# Patient Record
Sex: FEMALE | Race: WHITE | NOT HISPANIC OR LATINO | Employment: UNEMPLOYED | URBAN - METROPOLITAN AREA
[De-identification: names, ages, dates, MRNs, and addresses within clinical notes are randomized per-mention and may not be internally consistent; named-entity substitution may affect disease eponyms.]

---

## 2023-01-31 PROBLEM — F51.04 CHRONIC INSOMNIA: Status: ACTIVE | Noted: 2023-01-31

## 2023-01-31 PROBLEM — S20.211A RIB CONTUSION, RIGHT, INITIAL ENCOUNTER: Status: ACTIVE | Noted: 2023-01-31

## 2023-01-31 PROBLEM — R73.9 ELEVATED BLOOD SUGAR: Status: ACTIVE | Noted: 2023-01-31

## 2023-01-31 PROBLEM — J06.9 UPPER RESPIRATORY INFECTION WITH COUGH AND CONGESTION: Status: ACTIVE | Noted: 2023-01-31

## 2023-01-31 PROBLEM — M54.16 LEFT LUMBAR RADICULOPATHY: Status: ACTIVE | Noted: 2023-01-31

## 2023-01-31 PROBLEM — B00.9 HERPES SIMPLEX: Status: ACTIVE | Noted: 2023-01-31

## 2023-01-31 PROBLEM — R76.8 POSITIVE SM/RNP ANTIBODY: Status: ACTIVE | Noted: 2023-01-31

## 2023-01-31 PROBLEM — F41.9 MILD ANXIETY: Status: ACTIVE | Noted: 2023-01-31

## 2023-01-31 PROBLEM — G25.81 RESTLESS LEGS SYNDROME: Status: ACTIVE | Noted: 2023-01-31

## 2023-01-31 PROBLEM — J45.909 ASTHMA (HHS-HCC): Status: ACTIVE | Noted: 2023-01-31

## 2023-01-31 PROBLEM — G89.29: Status: ACTIVE | Noted: 2023-01-31

## 2023-01-31 PROBLEM — M94.0 COSTOCHONDRITIS: Status: ACTIVE | Noted: 2023-01-31

## 2023-01-31 PROBLEM — I48.91 NEW ONSET ATRIAL FIBRILLATION (MULTI): Status: ACTIVE | Noted: 2023-01-31

## 2023-01-31 PROBLEM — H61.20 CERUMEN IMPACTION: Status: ACTIVE | Noted: 2023-01-31

## 2023-01-31 PROBLEM — G56.00 CARPAL TUNNEL SYNDROME: Status: ACTIVE | Noted: 2023-01-31

## 2023-01-31 PROBLEM — J06.9 URI (UPPER RESPIRATORY INFECTION): Status: ACTIVE | Noted: 2023-01-31

## 2023-01-31 PROBLEM — U07.1 COVID-19: Status: ACTIVE | Noted: 2023-01-31

## 2023-01-31 PROBLEM — I48.20 CHRONIC ATRIAL FIBRILLATION (MULTI): Status: ACTIVE | Noted: 2023-01-31

## 2023-01-31 PROBLEM — J32.9 RHINOSINUSITIS: Status: ACTIVE | Noted: 2023-01-31

## 2023-01-31 PROBLEM — L40.50 PSORIATIC ARTHRITIS (MULTI): Status: ACTIVE | Noted: 2023-01-31

## 2023-01-31 PROBLEM — S46.019A ROTATOR CUFF STRAIN: Status: ACTIVE | Noted: 2023-01-31

## 2023-01-31 PROBLEM — R73.01 IMPAIRED FASTING GLUCOSE: Status: ACTIVE | Noted: 2023-01-31

## 2023-01-31 PROBLEM — E04.9 NONTOXIC GOITER: Status: ACTIVE | Noted: 2023-01-31

## 2023-01-31 PROBLEM — R04.2 COUGH WITH HEMOPTYSIS: Status: ACTIVE | Noted: 2023-01-31

## 2023-01-31 PROBLEM — N18.9 CKD (CHRONIC KIDNEY DISEASE): Status: ACTIVE | Noted: 2023-01-31

## 2023-01-31 PROBLEM — R06.02 SOB (SHORTNESS OF BREATH) ON EXERTION: Status: ACTIVE | Noted: 2023-01-31

## 2023-01-31 PROBLEM — J01.90 ACUTE SINUSITIS: Status: ACTIVE | Noted: 2023-01-31

## 2023-01-31 PROBLEM — N28.9 KIDNEY FUNCTION ABNORMAL: Status: ACTIVE | Noted: 2023-01-31

## 2023-01-31 PROBLEM — J01.90 ACUTE RHINOSINUSITIS: Status: ACTIVE | Noted: 2023-01-31

## 2023-01-31 PROBLEM — E03.9 HYPOTHYROIDISM: Status: ACTIVE | Noted: 2023-01-31

## 2023-01-31 PROBLEM — J06.9 UPPER RESPIRATORY INFECTION, ACUTE: Status: ACTIVE | Noted: 2023-01-31
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
## 2023-01-31 PROBLEM — I48.91 ATRIAL FIBRILLATION WITH RVR (MULTI): Status: ACTIVE | Noted: 2023-01-31

## 2023-01-31 PROBLEM — S22.39XA RIB FRACTURE: Status: ACTIVE | Noted: 2023-01-31

## 2023-01-31 PROBLEM — R09.82 POSTNASAL DRIP: Status: ACTIVE | Noted: 2023-01-31

## 2023-01-31 PROBLEM — R51.9 SINUS HEADACHE: Status: ACTIVE | Noted: 2023-01-31

## 2023-01-31 PROBLEM — I10 BENIGN ESSENTIAL HYPERTENSION: Status: ACTIVE | Noted: 2023-01-31

## 2023-01-31 PROBLEM — B00.1 COLD SORE: Status: ACTIVE | Noted: 2023-01-31

## 2023-01-31 PROBLEM — M54.50: Status: ACTIVE | Noted: 2023-01-31

## 2023-01-31 PROBLEM — E78.5 HYPERLIPIDEMIA: Status: ACTIVE | Noted: 2023-01-31

## 2023-01-31 PROBLEM — R60.0 LOWER EXTREMITY EDEMA: Status: ACTIVE | Noted: 2023-01-31

## 2023-01-31 PROBLEM — R05.9 COUGH: Status: ACTIVE | Noted: 2023-01-31

## 2023-01-31 RX ORDER — CLOBETASOL PROPIONATE 0.5 MG/G
1 CREAM TOPICAL 2 TIMES DAILY
COMMUNITY

## 2023-01-31 RX ORDER — TRAZODONE HYDROCHLORIDE 50 MG/1
50 TABLET ORAL NIGHTLY PRN
COMMUNITY
Start: 2016-03-24

## 2023-01-31 RX ORDER — ACETAMINOPHEN 500 MG
1 TABLET ORAL EVERY 4 HOURS PRN
COMMUNITY
Start: 2017-12-04

## 2023-01-31 RX ORDER — PREDNISONE 5 MG/1
1 TABLET ORAL DAILY
COMMUNITY
Start: 2021-06-22

## 2023-01-31 RX ORDER — LEVOTHYROXINE SODIUM 88 UG/1
1 TABLET ORAL DAILY
COMMUNITY
Start: 2015-09-29 | End: 2024-03-25 | Stop reason: SDUPTHER

## 2023-01-31 RX ORDER — ATENOLOL 25 MG/1
50 TABLET ORAL DAILY
COMMUNITY
Start: 2017-12-04 | End: 2023-03-20 | Stop reason: SDUPTHER

## 2023-01-31 RX ORDER — CETIRIZINE HYDROCHLORIDE 10 MG/1
1 TABLET ORAL DAILY
COMMUNITY
Start: 2017-12-04

## 2023-01-31 RX ORDER — FENOFIBRATE 48 MG/1
1 TABLET, FILM COATED ORAL DAILY
COMMUNITY
Start: 2021-04-07 | End: 2023-03-20 | Stop reason: SDUPTHER

## 2023-01-31 RX ORDER — ROPINIROLE 0.5 MG/1
1 TABLET, FILM COATED ORAL NIGHTLY
COMMUNITY
Start: 2020-01-08 | End: 2024-02-01 | Stop reason: SDUPTHER

## 2023-01-31 RX ORDER — ROSUVASTATIN CALCIUM 40 MG/1
1 TABLET, COATED ORAL DAILY
COMMUNITY
Start: 2016-10-06 | End: 2024-02-01 | Stop reason: SDUPTHER

## 2023-01-31 RX ORDER — VALACYCLOVIR HYDROCHLORIDE 1 G/1
1000 TABLET, FILM COATED ORAL 2 TIMES DAILY
COMMUNITY

## 2023-01-31 RX ORDER — DILTIAZEM HYDROCHLORIDE 120 MG/1
1 CAPSULE, COATED, EXTENDED RELEASE ORAL DAILY
COMMUNITY
Start: 2017-09-08 | End: 2023-03-20 | Stop reason: SDUPTHER

## 2023-01-31 RX ORDER — OMEPRAZOLE 40 MG/1
1 CAPSULE, DELAYED RELEASE ORAL DAILY
COMMUNITY
Start: 2019-08-21 | End: 2023-09-18 | Stop reason: SDUPTHER

## 2023-01-31 RX ORDER — CELECOXIB 200 MG/1
1-2 CAPSULE ORAL AS NEEDED
COMMUNITY
Start: 2020-05-05

## 2023-01-31 RX ORDER — MOMETASONE FUROATE 100 UG/1
2 AEROSOL RESPIRATORY (INHALATION) DAILY
COMMUNITY
Start: 2019-08-21 | End: 2023-10-26 | Stop reason: SDUPTHER

## 2023-03-14 ENCOUNTER — OFFICE VISIT (OUTPATIENT)
Dept: PRIMARY CARE | Facility: CLINIC | Age: 63
End: 2023-03-14
Payer: COMMERCIAL

## 2023-03-14 VITALS
HEIGHT: 68 IN | BODY MASS INDEX: 27.43 KG/M2 | SYSTOLIC BLOOD PRESSURE: 128 MMHG | WEIGHT: 181 LBS | DIASTOLIC BLOOD PRESSURE: 88 MMHG

## 2023-03-14 DIAGNOSIS — Z00.00 ANNUAL PHYSICAL EXAM: Primary | ICD-10-CM

## 2023-03-14 DIAGNOSIS — N18.30 STAGE 3 CHRONIC KIDNEY DISEASE, UNSPECIFIED WHETHER STAGE 3A OR 3B CKD (MULTI): ICD-10-CM

## 2023-03-14 DIAGNOSIS — I48.20 CHRONIC ATRIAL FIBRILLATION (MULTI): ICD-10-CM

## 2023-03-14 DIAGNOSIS — I10 BENIGN ESSENTIAL HYPERTENSION: ICD-10-CM

## 2023-03-14 DIAGNOSIS — E03.9 HYPOTHYROIDISM, UNSPECIFIED TYPE: ICD-10-CM

## 2023-03-14 DIAGNOSIS — E78.5 HYPERLIPIDEMIA, UNSPECIFIED HYPERLIPIDEMIA TYPE: ICD-10-CM

## 2023-03-14 DIAGNOSIS — R73.01 IMPAIRED FASTING GLUCOSE: ICD-10-CM

## 2023-03-14 DIAGNOSIS — I48.91 ATRIAL FIBRILLATION WITH RVR (MULTI): ICD-10-CM

## 2023-03-14 DIAGNOSIS — L40.50 PSORIATIC ARTHRITIS (MULTI): ICD-10-CM

## 2023-03-14 PROBLEM — J01.90 ACUTE SINUSITIS: Status: RESOLVED | Noted: 2023-01-31 | Resolved: 2023-03-14

## 2023-03-14 PROBLEM — F51.04 CHRONIC INSOMNIA: Status: RESOLVED | Noted: 2023-01-31 | Resolved: 2023-03-14

## 2023-03-14 PROBLEM — E04.9 NONTOXIC GOITER: Status: RESOLVED | Noted: 2023-01-31 | Resolved: 2023-03-14

## 2023-03-14 PROBLEM — R04.2 COUGH WITH HEMOPTYSIS: Status: RESOLVED | Noted: 2023-01-31 | Resolved: 2023-03-14

## 2023-03-14 PROBLEM — M54.50: Status: RESOLVED | Noted: 2023-01-31 | Resolved: 2023-03-14

## 2023-03-14 PROBLEM — U07.1 COVID-19: Status: RESOLVED | Noted: 2023-01-31 | Resolved: 2023-03-14

## 2023-03-14 PROBLEM — S22.39XA RIB FRACTURE: Status: RESOLVED | Noted: 2023-01-31 | Resolved: 2023-03-14

## 2023-03-14 PROBLEM — R51.9 SINUS HEADACHE: Status: RESOLVED | Noted: 2023-01-31 | Resolved: 2023-03-14

## 2023-03-14 PROBLEM — R60.0 LOWER EXTREMITY EDEMA: Status: RESOLVED | Noted: 2023-01-31 | Resolved: 2023-03-14

## 2023-03-14 PROBLEM — S46.019A ROTATOR CUFF STRAIN: Status: RESOLVED | Noted: 2023-01-31 | Resolved: 2023-03-14

## 2023-03-14 PROBLEM — G56.00 CARPAL TUNNEL SYNDROME: Status: RESOLVED | Noted: 2023-01-31 | Resolved: 2023-03-14
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
## 2023-03-14 PROBLEM — R05.9 COUGH: Status: RESOLVED | Noted: 2023-01-31 | Resolved: 2023-03-14

## 2023-03-14 PROBLEM — R73.9 ELEVATED BLOOD SUGAR: Status: RESOLVED | Noted: 2023-01-31 | Resolved: 2023-03-14

## 2023-03-14 PROBLEM — J06.9 URI (UPPER RESPIRATORY INFECTION): Status: RESOLVED | Noted: 2023-01-31 | Resolved: 2023-03-14

## 2023-03-14 PROBLEM — R06.02 SOB (SHORTNESS OF BREATH) ON EXERTION: Status: RESOLVED | Noted: 2023-01-31 | Resolved: 2023-03-14

## 2023-03-14 PROBLEM — J06.9 UPPER RESPIRATORY INFECTION, ACUTE: Status: RESOLVED | Noted: 2023-01-31 | Resolved: 2023-03-14

## 2023-03-14 PROBLEM — H61.20 CERUMEN IMPACTION: Status: RESOLVED | Noted: 2023-01-31 | Resolved: 2023-03-14

## 2023-03-14 PROBLEM — G25.81 RESTLESS LEGS SYNDROME: Status: RESOLVED | Noted: 2023-01-31 | Resolved: 2023-03-14

## 2023-03-14 PROBLEM — N28.9 KIDNEY FUNCTION ABNORMAL: Status: RESOLVED | Noted: 2023-01-31 | Resolved: 2023-03-14

## 2023-03-14 PROBLEM — M94.0 COSTOCHONDRITIS: Status: RESOLVED | Noted: 2023-01-31 | Resolved: 2023-03-14

## 2023-03-14 PROBLEM — S20.211A RIB CONTUSION, RIGHT, INITIAL ENCOUNTER: Status: RESOLVED | Noted: 2023-01-31 | Resolved: 2023-03-14

## 2023-03-14 PROBLEM — G89.29: Status: RESOLVED | Noted: 2023-01-31 | Resolved: 2023-03-14

## 2023-03-14 PROBLEM — J45.909 ASTHMA (HHS-HCC): Status: RESOLVED | Noted: 2023-01-31 | Resolved: 2023-03-14

## 2023-03-14 PROBLEM — R09.82 POSTNASAL DRIP: Status: RESOLVED | Noted: 2023-01-31 | Resolved: 2023-03-14

## 2023-03-14 PROBLEM — J06.9 UPPER RESPIRATORY INFECTION WITH COUGH AND CONGESTION: Status: RESOLVED | Noted: 2023-01-31 | Resolved: 2023-03-14

## 2023-03-14 PROBLEM — B00.1 COLD SORE: Status: RESOLVED | Noted: 2023-01-31 | Resolved: 2023-03-14

## 2023-03-14 PROBLEM — J01.90 ACUTE RHINOSINUSITIS: Status: RESOLVED | Noted: 2023-01-31 | Resolved: 2023-03-14

## 2023-03-14 PROBLEM — M54.16 LEFT LUMBAR RADICULOPATHY: Status: RESOLVED | Noted: 2023-01-31 | Resolved: 2023-03-14

## 2023-03-14 PROCEDURE — 3079F DIAST BP 80-89 MM HG: CPT | Performed by: INTERNAL MEDICINE

## 2023-03-14 PROCEDURE — 99396 PREV VISIT EST AGE 40-64: CPT | Performed by: INTERNAL MEDICINE

## 2023-03-14 PROCEDURE — 3074F SYST BP LT 130 MM HG: CPT | Performed by: INTERNAL MEDICINE

## 2023-03-14 NOTE — PROGRESS NOTES
"Subjective   Patient ID: Sommer Guido is a 62 y.o. female who presents for the following    PHYSICAL     Assessment/Plan   impaired fasting glucose:  STABLE     psoriatic arthritis possibly impacting her lungs (uses an inhaler): stable, following with dr vargas , getting immunologic infusion at this time     asthma: patient follows with dr mackenzie.      a-fib : stable on xeralto, follows with dr RILEY     hypothyroid: check TSH       Follow up with GYN and follow up for mammogram     Colonoscopy is due, recommend follow up    HPI  with HTN, HLD, hypothyroid, DIANNA (CPAP), CKD3, and Chronic A. Fib (xarelto and atenolol); hx COVID here for the following            psoriatic arthritis: following with rheumatology. patient mentions that she was placed on prednisone for her \"lungs\" as she was told that her psoriatic arthritis is impacting her lungs. she gets aria gtt but she is no longer on prednisone      A. fib: Stable, on Xarelto and atenolol, follows with Dr. RILEY     DIANNA: stable, on CPAP, follows with dr mackenzie.      CKD3: Patient has been taking off diuretics, advised to avoid NSAIDS. patient has small amounts of protein in the urine. renal ultrasound may 28, 2020 is wnl.      hypothyroid: patient denies any hypo or hypothyroid symptoms. patient denies any palpitations, diarrhea or constipation     HTN: stable,      HLD: Patient denies any muscle aches and is tolerating statin therapy    Past surgical hx:  tonsil stones/bilateral tonsillectomy Dr Skinner 9/14/2022    Visit Vitals  /88   Ht 1.727 m (5' 8\")   Wt 82.1 kg (181 lb)   BMI 27.52 kg/m²   Smoking Status Former   BSA 1.98 m²     PHYSICAL EXAM   General appearance: Alert and in no acute distress. speech is clear and coherent  HEENT: Sclera and conjunctiva white, EOMI, uvela midline, no mouth lesions. PERRLA,  nasal turbinates are not swollen without exudate. TM's Tucker with cone of light, external ear canal with scant cerumen. No head trauma  Neck: no carotid " bruits or thyromegaly. no lymphadenopathy   Respiratory : No respiratory distress, normal respiratory rhythm and effort. Clear bilateral breath sounds. No wheezing or rhonchi.   Cardiovascular: heart rate regular, S1, S2. no murmurs. no Lower extremity edema  Skin inspection: Normal skin color and pigmentation, normal skin turgor and no visible rash, induration, or cellulitis  MSK: 5/5 strength upper and lower extremities without gait abnormalities. no loss of muscle mass   Neuro: 2-12 CN grossly intact.  no slurred speech. no lateralizing deficit  Psychiatric Orientation: Oriented to person, place, and time. no depression, homicidal or suicidal thoughts, normal affect  Abdomen: soft, none tender, none distended. no organomegaly      REVIEW OF SYSTEMS     Constitutional: not feeling tired and no fever, chills or sweats. Denies weight loss    HEENT: no earache and no sore throat. no blurred vision and or double vision. no headache  Cardiovascular: no exertional chest pain, no palpitations, no lower extremity edema and no intermittent leg claudication.   Lungs: Denies shortness of breath, exertional dyspnea, wheezing  Gastrointestinal: no change in bowel habits, no diarrhea, no nausea, no vomiting and no abdominal pain. Denies Melena, brbpr or dark stool  Musculoskeletal: no myalgias, no muscle weakness and no limb swelling.   Skin: no rashes, no change in skin color and pigmentation and no skin lumps.   Neurological: no headaches, no seizures, no numbness, no lateralizing deficits and no fainting.   Psychiatric: no depression and no anxiety.   Urine: denies polyuria, hematuria, dysuria   Endocrine: no cold intolerance, no heat intolerance    Allergies   Allergen Reactions    Losartan Unknown       Current Outpatient Medications   Medication Sig Dispense Refill    acetaminophen (Tylenol Extra Strength) 500 mg tablet Take 1 tablet (500 mg) by mouth every 4 (four) hours if needed. 4-6 hours      atenolol (Tenormin) 25  mg tablet Take 2 tablets (50 mg) by mouth in the morning. 1/2 tab prn      celecoxib (CeleBREX) 200 mg capsule Take 1-2 capsules (200-400 mg) by mouth if needed.      cetirizine (ZyrTEC) 10 mg tablet Take 1 tablet (10 mg) by mouth 1 (one) time each day.      clobetasol (Temovate) 0.05 % cream Apply 1 Application topically in the morning and at bedtime. Apply to affected area      dilTIAZem CD (Cartia XT) 120 mg 24 hr capsule Take 1 capsule (120 mg) by mouth 1 (one) time each day.      fenofibrate (Tricor) 48 mg tablet Take 1 tablet (48 mg) by mouth 1 (one) time each day.      levothyroxine (Synthroid, Levoxyl) 88 mcg tablet Take 1 tablet (88 mcg) by mouth 1 (one) time each day.      mometasone (Asmanex HFA) 100 mcg/actuation HFA aerosol inhaler Inhale 2 puffs 1 (one) time each day.      omeprazole (PriLOSEC) 40 mg DR capsule Take 1 capsule (40 mg) by mouth 1 (one) time each day.      predniSONE (Deltasone) 5 mg tablet Take 1 tablet (5 mg) by mouth 1 (one) time each day.      rivaroxaban (Xarelto) 20 mg tablet Take 1 tablet (20 mg) by mouth 1 (one) time each day. Take with food      rOPINIRole (Requip) 0.5 mg tablet Take 1 tablet (0.5 mg) by mouth at bedtime.      rosuvastatin (Crestor) 40 mg tablet Take 1 tablet (40 mg) by mouth 1 (one) time each day.      traZODone (Desyrel) 50 mg tablet Take 1 tablet (50 mg) by mouth if needed at bedtime.      valACYclovir (Valtrex) 1 gram tablet Take 1 tablet (1,000 mg) by mouth in the morning and at bedtime. For 10 days then reduce to 1 tab daily thereafter       No current facility-administered medications for this visit.       Objective     No visits with results within 4 Month(s) from this visit.   Latest known visit with results is:   Legacy Encounter on 11/30/2021   Component Date Value Ref Range Status    Glucose 11/30/2021 70 (L)  74 - 99 mg/dL Final    Sodium 11/30/2021 144  136 - 145 mmol/L Final    Potassium 11/30/2021 4.1  3.5 - 5.3 mmol/L Final    Chloride 11/30/2021  106  98 - 107 mmol/L Final    Bicarbonate 11/30/2021 27  21 - 32 mmol/L Final    Anion Gap 11/30/2021 15  10 - 20 mmol/L Final    Urea Nitrogen 11/30/2021 32 (H)  6 - 23 mg/dL Final    Creatinine 11/30/2021 1.04  0.50 - 1.05 mg/dL Final    GLOMERULAR FILTRATION RATE-NON AFR* 11/30/2021 54 (A)  >60 mL/min/1.73m2 Final    GLOMERULAR FILTRATION RATE-* 11/30/2021 65  >60 mL/min/1.73m2 Final    Calcium 11/30/2021 9.8  8.6 - 10.6 mg/dL Final    Albumin 11/30/2021 4.2  3.4 - 5.0 g/dL Final    Alkaline Phosphatase 11/30/2021 65  33 - 136 U/L Final    Total Protein 11/30/2021 6.9  6.4 - 8.2 g/dL Final    AST 11/30/2021 17  9 - 39 U/L Final    Total Bilirubin 11/30/2021 0.5  0.0 - 1.2 mg/dL Final    ALT (SGPT) 11/30/2021 28  7 - 45 U/L Final    WBC 11/30/2021 11.8 (H)  4.4 - 11.3 x10E9/L Final    nRBC 11/30/2021 0.0  0.0 - 0.0 /100 WBC Final    RBC 11/30/2021 4.07  4.00 - 5.20 x10E12/L Final    Hemoglobin 11/30/2021 13.1  12.0 - 16.0 g/dL Final    Hematocrit 11/30/2021 41.9  36.0 - 46.0 % Final    MCV 11/30/2021 103 (H)  80 - 100 fL Final    MCHC 11/30/2021 31.3 (L)  32.0 - 36.0 g/dL Final    Platelets 11/30/2021 262  150 - 450 x10E9/L Final    RDW 11/30/2021 13.5  11.5 - 14.5 % Final    Sedimentation Rate 11/30/2021 10  0 - 30 mm/h Final    CRP 11/30/2021 <0.10  mg/dL Final    Hemoglobin A1C 11/30/2021 6.4 (A)  % Final    Estimated Average Glucose 11/30/2021 137  MG/DL Final    TSH 11/30/2021 0.87  0.44 - 3.98 mIU/L Final       Radiology: Reviewed imaging in powerchart.  No results found.    Family History   Problem Relation Name Age of Onset    Atrial fibrillation Father      Atrial fibrillation Brother      Atrial fibrillation Mother's Sister      Diabetes Mother's Sister      Alzheimer's disease Maternal Grandmother       Social History     Socioeconomic History    Marital status:      Spouse name: None    Number of children: None    Years of education: None    Highest education level: None   Occupational  History    None   Tobacco Use    Smoking status: Former     Types: Cigarettes    Smokeless tobacco: None   Vaping Use    Vaping status: None   Substance and Sexual Activity    Alcohol use: Not Currently    Drug use: None    Sexual activity: None   Other Topics Concern    None   Social History Narrative    None     Social Determinants of Health     Financial Resource Strain: Not on file   Food Insecurity: Not on file   Transportation Needs: Not on file   Physical Activity: Not on file   Stress: Not on file   Social Connections: Not on file   Intimate Partner Violence: Not on file   Housing Stability: Not on file     Past Medical History:   Diagnosis Date    Contusion of right front wall of thorax, initial encounter     Bruised rib, right, initial encounter    Cutaneous abscess of face 04/14/2015    Abscess, eyebrow    Essential (primary) hypertension 06/15/2021    Benign essential hypertension    Hyperlipidemia, unspecified 09/13/2022    Hyperlipidemia    Hypothyroidism, unspecified 09/13/2022    Hypothyroidism    Toxic effect of venom of bees, accidental (unintentional), initial encounter 08/29/2019    Bee sting    Unspecified atrial fibrillation (CMS/HCC) 08/21/2019    Atrial fibrillation with RVR     Past Surgical History:   Procedure Laterality Date    OTHER SURGICAL HISTORY  11/26/2018    Ablation    OTHER SURGICAL HISTORY  11/26/2018    Tubal ligation       Charting was completed using voice recognition technology and may include unintended errors.

## 2023-03-20 DIAGNOSIS — E78.5 HYPERLIPIDEMIA, UNSPECIFIED HYPERLIPIDEMIA TYPE: ICD-10-CM

## 2023-03-20 DIAGNOSIS — I48.91 ATRIAL FIBRILLATION WITH RVR (MULTI): ICD-10-CM

## 2023-03-20 DIAGNOSIS — I10 BENIGN ESSENTIAL HYPERTENSION: ICD-10-CM

## 2023-03-21 RX ORDER — DILTIAZEM HYDROCHLORIDE 120 MG/1
120 CAPSULE, COATED, EXTENDED RELEASE ORAL DAILY
Qty: 90 CAPSULE | Refills: 1 | Status: SHIPPED | OUTPATIENT
Start: 2023-03-21 | End: 2023-12-04 | Stop reason: SDUPTHER

## 2023-03-21 RX ORDER — ATENOLOL 25 MG/1
50 TABLET ORAL DAILY
Qty: 180 TABLET | Refills: 1 | Status: SHIPPED | OUTPATIENT
Start: 2023-03-21 | End: 2024-03-25 | Stop reason: SDUPTHER

## 2023-03-21 RX ORDER — FENOFIBRATE 48 MG/1
48 TABLET, FILM COATED ORAL DAILY
Qty: 90 TABLET | Refills: 1 | Status: SHIPPED | OUTPATIENT
Start: 2023-03-21 | End: 2023-12-04 | Stop reason: SDUPTHER

## 2023-04-12 ENCOUNTER — TELEPHONE (OUTPATIENT)
Dept: PRIMARY CARE | Facility: CLINIC | Age: 63
End: 2023-04-12
Payer: COMMERCIAL

## 2023-04-12 NOTE — TELEPHONE ENCOUNTER
Patient has valtrex for cold sores and now has fever blisters needs something stronger. Can you rx something?

## 2023-09-11 ENCOUNTER — OFFICE VISIT (OUTPATIENT)
Dept: PRIMARY CARE | Facility: CLINIC | Age: 63
End: 2023-09-11
Payer: COMMERCIAL

## 2023-09-11 ENCOUNTER — LAB (OUTPATIENT)
Dept: LAB | Facility: LAB | Age: 63
End: 2023-09-11
Payer: COMMERCIAL

## 2023-09-11 VITALS
HEIGHT: 68 IN | WEIGHT: 182.4 LBS | SYSTOLIC BLOOD PRESSURE: 108 MMHG | DIASTOLIC BLOOD PRESSURE: 60 MMHG | HEART RATE: 88 BPM | TEMPERATURE: 98.4 F | BODY MASS INDEX: 27.65 KG/M2

## 2023-09-11 DIAGNOSIS — E78.5 HYPERLIPIDEMIA, UNSPECIFIED HYPERLIPIDEMIA TYPE: ICD-10-CM

## 2023-09-11 DIAGNOSIS — E03.9 HYPOTHYROIDISM, UNSPECIFIED TYPE: ICD-10-CM

## 2023-09-11 DIAGNOSIS — I48.20 CHRONIC ATRIAL FIBRILLATION (MULTI): ICD-10-CM

## 2023-09-11 DIAGNOSIS — Z13.9 SCREENING DUE: ICD-10-CM

## 2023-09-11 DIAGNOSIS — N18.31 STAGE 3A CHRONIC KIDNEY DISEASE (MULTI): ICD-10-CM

## 2023-09-11 DIAGNOSIS — I10 BENIGN ESSENTIAL HYPERTENSION: ICD-10-CM

## 2023-09-11 DIAGNOSIS — I10 BENIGN ESSENTIAL HYPERTENSION: Primary | ICD-10-CM

## 2023-09-11 DIAGNOSIS — R73.01 IMPAIRED FASTING GLUCOSE: ICD-10-CM

## 2023-09-11 LAB
ERYTHROCYTE DISTRIBUTION WIDTH (RATIO) BY AUTOMATED COUNT: 13.3 % (ref 11.5–14.5)
ERYTHROCYTE MEAN CORPUSCULAR HEMOGLOBIN CONCENTRATION (G/DL) BY AUTOMATED: 30.9 G/DL (ref 32–36)
ERYTHROCYTE MEAN CORPUSCULAR VOLUME (FL) BY AUTOMATED COUNT: 102 FL (ref 80–100)
ERYTHROCYTES (10*6/UL) IN BLOOD BY AUTOMATED COUNT: 4.11 X10E12/L (ref 4–5.2)
HEMATOCRIT (%) IN BLOOD BY AUTOMATED COUNT: 42.1 % (ref 36–46)
HEMOGLOBIN (G/DL) IN BLOOD: 13 G/DL (ref 12–16)
LEUKOCYTES (10*3/UL) IN BLOOD BY AUTOMATED COUNT: 11.3 X10E9/L (ref 4.4–11.3)
NRBC (PER 100 WBCS) BY AUTOMATED COUNT: 0 /100 WBC (ref 0–0)
PLATELETS (10*3/UL) IN BLOOD AUTOMATED COUNT: 260 X10E9/L (ref 150–450)

## 2023-09-11 PROCEDURE — 3074F SYST BP LT 130 MM HG: CPT | Performed by: INTERNAL MEDICINE

## 2023-09-11 PROCEDURE — 3078F DIAST BP <80 MM HG: CPT | Performed by: INTERNAL MEDICINE

## 2023-09-11 PROCEDURE — 82570 ASSAY OF URINE CREATININE: CPT

## 2023-09-11 PROCEDURE — 83036 HEMOGLOBIN GLYCOSYLATED A1C: CPT

## 2023-09-11 PROCEDURE — 84443 ASSAY THYROID STIM HORMONE: CPT

## 2023-09-11 PROCEDURE — 80053 COMPREHEN METABOLIC PANEL: CPT

## 2023-09-11 PROCEDURE — 84156 ASSAY OF PROTEIN URINE: CPT

## 2023-09-11 PROCEDURE — 85027 COMPLETE CBC AUTOMATED: CPT

## 2023-09-11 PROCEDURE — 80061 LIPID PANEL: CPT

## 2023-09-11 PROCEDURE — 82306 VITAMIN D 25 HYDROXY: CPT

## 2023-09-11 PROCEDURE — 99214 OFFICE O/P EST MOD 30 MIN: CPT | Performed by: INTERNAL MEDICINE

## 2023-09-11 PROCEDURE — 36415 COLL VENOUS BLD VENIPUNCTURE: CPT

## 2023-09-11 RX ORDER — DAPAGLIFLOZIN 10 MG/1
10 TABLET, FILM COATED ORAL DAILY
Qty: 90 TABLET | Refills: 3 | Status: SHIPPED | OUTPATIENT
Start: 2023-09-11 | End: 2023-09-22 | Stop reason: ALTCHOICE

## 2023-09-11 NOTE — PROGRESS NOTES
"Subjective   Patient ID: Sommer Guido is a 63 y.o. female who presents for the following    PHYSICAL 3/14/2023    Assessment/Plan   impaired fasting glucose:  Stable, check A1c      psoriatic arthritis possibly impacting her lungs (uses an inhaler): stable, following with dr vargas , getting immunologic infusion at this time     asthma: patient follows with dr mackenzie.      a-fib : stable on xeralto, follows with dr RILEY    CKD stage 3: GFR 50s     Started farxiga 10mg 9/11/2023     hypothyroid: check TSH       Follow up with GYN and follow up for mammogram     Colonoscopy is due, recommend follow up    HPI  with HTN, HLD, hypothyroid, DIANNA (CPAP), CKD3, and Chronic A. Fib (xarelto and atenolol); hx COVID here for the following       psoriatic arthritis: following with rheumatology. patient mentions that she was placed on prednisone for her \"lungs\" as she was told that her psoriatic arthritis is impacting her lungs. she gets aria gtt but she is no longer on prednisone      A. fib: Stable, on Xarelto and atenolol, follows with Dr. RILEY     DIANNA: stable, on CPAP, follows with dr mackenzie.      CKD3: Patient has been taking off diuretics, advised to avoid NSAIDS. patient has small amounts of protein in the urine. renal ultrasound may 28, 2020 is wnl.      hypothyroid: patient denies any hypo or hypothyroid symptoms. patient denies any palpitations, diarrhea or constipation     HTN: stable,      HLD: Patient denies any muscle aches and is tolerating statin therapy    Past surgical hx:  tonsil stones/bilateral tonsillectomy Dr Skinner 9/14/2022    Visit Vitals  /60 (BP Location: Right arm, Patient Position: Sitting)   Pulse 88   Temp 36.9 °C (98.4 °F)   Ht 1.727 m (5' 8\")   Wt 82.7 kg (182 lb 6.4 oz)   BMI 27.73 kg/m²   Smoking Status Former   BSA 1.99 m²     PHYSICAL EXAM   General appearance: Alert and in no acute distress. speech is clear and coherent  HEENT: Sclera and conjunctiva white, EOMI,     Respiratory : No " respiratory distress, normal respiratory rhythm and effort. Clear bilateral breath sounds. No wheezing or rhonchi.   Cardiovascular: heart rate regular, S1, S2. no murmurs. no Lower extremity edema  Skin inspection: Normal skin color and pigmentation, normal skin turgor and no visible rash, induration, or cellulitis  MSK: 5/5 strength upper and lower extremities without gait abnormalities. no loss of muscle mass   Neuro: 2-12 CN grossly intact.  no slurred speech. no lateralizing deficit  Psychiatric Orientation: Oriented to person, place, and time. no depression, homicidal or suicidal thoughts, normal affect       REVIEW OF SYSTEMS     Constitutional: not feeling tired and no fever, chills or sweats. Denies weight loss    HEENT: no earache and no sore throat. no blurred vision and or double vision. no headache  Cardiovascular: no exertional chest pain, no palpitations, no lower extremity edema    Lungs: Denies shortness of breath, exertional dyspnea, wheezing  Gastrointestinal: no change in bowel habits, no diarrhea, no nausea   Musculoskeletal: no myalgias, no muscle weakness and no limb swelling.    Neurological: no headaches, no seizures, no numbness, no lateralizing deficits   Psychiatric: no depression and no anxiety.   Urine: denies polyuria, hematuria, dysuria     Allergies   Allergen Reactions    Losartan Unknown       Current Outpatient Medications   Medication Sig Dispense Refill    acetaminophen (Tylenol Extra Strength) 500 mg tablet Take 1 tablet (500 mg) by mouth every 4 hours if needed. 4-6 hours      atenolol (Tenormin) 25 mg tablet Take 2 tablets (50 mg) by mouth once daily. 1/2 tab prn 180 tablet 1    celecoxib (CeleBREX) 200 mg capsule Take 1-2 capsules (200-400 mg) by mouth if needed.      cetirizine (ZyrTEC) 10 mg tablet Take 1 tablet (10 mg) by mouth once daily.      clobetasol (Temovate) 0.05 % cream Apply 1 Application topically 2 times a day. Apply to affected area      dilTIAZem CD (Cartia  XT) 120 mg 24 hr capsule Take 1 capsule (120 mg) by mouth once daily. 90 capsule 1    fenofibrate (Tricor) 48 mg tablet Take 1 tablet (48 mg) by mouth once daily. 90 tablet 1    levothyroxine (Synthroid, Levoxyl) 88 mcg tablet Take 1 tablet (88 mcg) by mouth once daily.      mometasone (Asmanex HFA) 100 mcg/actuation HFA aerosol inhaler Inhale 2 puffs 1 (one) time each day.      omeprazole (PriLOSEC) 40 mg DR capsule Take 1 capsule (40 mg) by mouth once daily.      predniSONE (Deltasone) 5 mg tablet Take 1 tablet (5 mg) by mouth once daily.      rivaroxaban (Xarelto) 20 mg tablet Take 1 tablet (20 mg) by mouth once daily in the evening. Take with meals. Take with food 90 tablet 1    rOPINIRole (Requip) 0.5 mg tablet Take 1 tablet (0.5 mg) by mouth once daily at bedtime.      rosuvastatin (Crestor) 40 mg tablet Take 1 tablet (40 mg) by mouth once daily.      traZODone (Desyrel) 50 mg tablet Take 1 tablet (50 mg) by mouth as needed at bedtime.      valACYclovir (Valtrex) 1 gram tablet Take 1 tablet (1,000 mg) by mouth 2 times a day. For 10 days then reduce to 1 tab daily thereafter       No current facility-administered medications for this visit.       Objective     No visits with results within 4 Month(s) from this visit.   Latest known visit with results is:   Legacy Encounter on 11/30/2021   Component Date Value Ref Range Status    Glucose 11/30/2021 70 (L)  74 - 99 mg/dL Final    Sodium 11/30/2021 144  136 - 145 mmol/L Final    Potassium 11/30/2021 4.1  3.5 - 5.3 mmol/L Final    Chloride 11/30/2021 106  98 - 107 mmol/L Final    Bicarbonate 11/30/2021 27  21 - 32 mmol/L Final    Anion Gap 11/30/2021 15  10 - 20 mmol/L Final    Urea Nitrogen 11/30/2021 32 (H)  6 - 23 mg/dL Final    Creatinine 11/30/2021 1.04  0.50 - 1.05 mg/dL Final    GLOMERULAR FILTRATION RATE-NON AFR* 11/30/2021 54 (A)  >60 mL/min/1.73m2 Final    GLOMERULAR FILTRATION RATE-* 11/30/2021 65  >60 mL/min/1.73m2 Final    Calcium 11/30/2021 9.8   8.6 - 10.6 mg/dL Final    Albumin 11/30/2021 4.2  3.4 - 5.0 g/dL Final    Alkaline Phosphatase 11/30/2021 65  33 - 136 U/L Final    Total Protein 11/30/2021 6.9  6.4 - 8.2 g/dL Final    AST 11/30/2021 17  9 - 39 U/L Final    Total Bilirubin 11/30/2021 0.5  0.0 - 1.2 mg/dL Final    ALT (SGPT) 11/30/2021 28  7 - 45 U/L Final    WBC 11/30/2021 11.8 (H)  4.4 - 11.3 x10E9/L Final    nRBC 11/30/2021 0.0  0.0 - 0.0 /100 WBC Final    RBC 11/30/2021 4.07  4.00 - 5.20 x10E12/L Final    Hemoglobin 11/30/2021 13.1  12.0 - 16.0 g/dL Final    Hematocrit 11/30/2021 41.9  36.0 - 46.0 % Final    MCV 11/30/2021 103 (H)  80 - 100 fL Final    MCHC 11/30/2021 31.3 (L)  32.0 - 36.0 g/dL Final    Platelets 11/30/2021 262  150 - 450 x10E9/L Final    RDW 11/30/2021 13.5  11.5 - 14.5 % Final    Sedimentation Rate 11/30/2021 10  0 - 30 mm/h Final    CRP 11/30/2021 <0.10  mg/dL Final    Hemoglobin A1C 11/30/2021 6.4 (A)  % Final    Estimated Average Glucose 11/30/2021 137  MG/DL Final    TSH 11/30/2021 0.87  0.44 - 3.98 mIU/L Final       Radiology: Reviewed imaging in powerchart.  No results found.    Family History   Problem Relation Name Age of Onset    Atrial fibrillation Father      Atrial fibrillation Brother      Atrial fibrillation Mother's Sister      Diabetes Mother's Sister      Alzheimer's disease Maternal Grandmother       Social History     Socioeconomic History    Marital status:      Spouse name: Not on file    Number of children: Not on file    Years of education: Not on file    Highest education level: Not on file   Occupational History    Not on file   Tobacco Use    Smoking status: Former     Types: Cigarettes    Smokeless tobacco: Not on file   Substance and Sexual Activity    Alcohol use: Not Currently    Drug use: Not on file    Sexual activity: Not on file   Other Topics Concern    Not on file   Social History Narrative    Not on file     Social Determinants of Health     Financial Resource Strain: Not on file    Food Insecurity: Not on file   Transportation Needs: Not on file   Physical Activity: Not on file   Stress: Not on file   Social Connections: Not on file   Intimate Partner Violence: Not on file   Housing Stability: Not on file     Past Medical History:   Diagnosis Date    Contusion of right front wall of thorax, initial encounter     Bruised rib, right, initial encounter    Cutaneous abscess of face 04/14/2015    Abscess, eyebrow    Essential (primary) hypertension 06/15/2021    Benign essential hypertension    Hyperlipidemia, unspecified 09/13/2022    Hyperlipidemia    Hypothyroidism, unspecified 09/13/2022    Hypothyroidism    Toxic effect of venom of bees, accidental (unintentional), initial encounter 08/29/2019    Bee sting    Unspecified atrial fibrillation (CMS/HCC) 08/21/2019    Atrial fibrillation with RVR     Past Surgical History:   Procedure Laterality Date    OTHER SURGICAL HISTORY  11/26/2018    Ablation    OTHER SURGICAL HISTORY  11/26/2018    Tubal ligation       Charting was completed using voice recognition technology and may include unintended errors.

## 2023-09-12 LAB
ALANINE AMINOTRANSFERASE (SGPT) (U/L) IN SER/PLAS: 32 U/L (ref 7–45)
ALBUMIN (G/DL) IN SER/PLAS: 4.5 G/DL (ref 3.4–5)
ALKALINE PHOSPHATASE (U/L) IN SER/PLAS: 47 U/L (ref 33–136)
ANION GAP IN SER/PLAS: 12 MMOL/L (ref 10–20)
ASPARTATE AMINOTRANSFERASE (SGOT) (U/L) IN SER/PLAS: 22 U/L (ref 9–39)
BILIRUBIN TOTAL (MG/DL) IN SER/PLAS: 0.9 MG/DL (ref 0–1.2)
CALCIDIOL (25 OH VITAMIN D3) (NG/ML) IN SER/PLAS: 35 NG/ML
CALCIUM (MG/DL) IN SER/PLAS: 10.5 MG/DL (ref 8.6–10.6)
CARBON DIOXIDE, TOTAL (MMOL/L) IN SER/PLAS: 30 MMOL/L (ref 21–32)
CHLORIDE (MMOL/L) IN SER/PLAS: 106 MMOL/L (ref 98–107)
CHOLESTEROL (MG/DL) IN SER/PLAS: 181 MG/DL (ref 0–199)
CHOLESTEROL IN HDL (MG/DL) IN SER/PLAS: 79.3 MG/DL
CHOLESTEROL/HDL RATIO: 2.3
CREATININE (MG/DL) IN SER/PLAS: 0.92 MG/DL (ref 0.5–1.05)
CREATININE (MG/DL) IN URINE: 173 MG/DL (ref 20–320)
ESTIMATED AVERAGE GLUCOSE FOR HBA1C: 117 MG/DL
GFR FEMALE: 70 ML/MIN/1.73M2
GLUCOSE (MG/DL) IN SER/PLAS: 72 MG/DL (ref 74–99)
HEMOGLOBIN A1C/HEMOGLOBIN TOTAL IN BLOOD: 5.7 %
LDL: 76 MG/DL (ref 0–99)
POTASSIUM (MMOL/L) IN SER/PLAS: 4.3 MMOL/L (ref 3.5–5.3)
PROTEIN (MG/DL) IN URINE: 14 MG/DL (ref 5–24)
PROTEIN TOTAL: 6.9 G/DL (ref 6.4–8.2)
PROTEIN/CREATININE (MG/MG) IN URINE: 0.08 MG/MG CREAT (ref 0–0.17)
SODIUM (MMOL/L) IN SER/PLAS: 144 MMOL/L (ref 136–145)
THYROTROPIN (MIU/L) IN SER/PLAS BY DETECTION LIMIT <= 0.05 MIU/L: 1.87 MIU/L (ref 0.44–3.98)
TRIGLYCERIDE (MG/DL) IN SER/PLAS: 130 MG/DL (ref 0–149)
UREA NITROGEN (MG/DL) IN SER/PLAS: 26 MG/DL (ref 6–23)
VLDL: 26 MG/DL (ref 0–40)

## 2023-09-18 DIAGNOSIS — K21.9 GASTROESOPHAGEAL REFLUX DISEASE WITHOUT ESOPHAGITIS: Primary | ICD-10-CM

## 2023-09-18 RX ORDER — OMEPRAZOLE 40 MG/1
40 CAPSULE, DELAYED RELEASE ORAL
Qty: 90 CAPSULE | Refills: 3 | Status: SHIPPED | OUTPATIENT
Start: 2023-09-18

## 2023-09-18 NOTE — TELEPHONE ENCOUNTER
Pt also states she is waiting for a CKD stage 3 medication you were to prescribe for her from this month's earlier office visit

## 2023-09-19 ENCOUNTER — TELEPHONE (OUTPATIENT)
Dept: PRIMARY CARE | Facility: CLINIC | Age: 63
End: 2023-09-19
Payer: COMMERCIAL

## 2023-09-19 NOTE — TELEPHONE ENCOUNTER
Pt left a msg asking us to call the pharmacy for her farixga. They have questions about it. I called nad left a general msg asking her which pharmacy

## 2023-09-21 ENCOUNTER — TELEPHONE (OUTPATIENT)
Dept: PRIMARY CARE | Facility: CLINIC | Age: 63
End: 2023-09-21
Payer: COMMERCIAL

## 2023-09-21 NOTE — TELEPHONE ENCOUNTER
Pt stated insurance will cover jardiance blanee maria d (not ccovered)    Can you write and send it to her pharmacy if you agree?    Thanks!

## 2023-09-21 NOTE — TELEPHONE ENCOUNTER
I request a PA for farxiga for this pt but the insurance already stated it is not a medically covered medicine  for the her health plan/it is on a list of uncovered meds  I informed the pt but she stated she'll call her insurance to switch meds    Pt will call insurance company and see what alternative medication will be covered under her plan. She will leave me a msg if unavailable

## 2023-09-21 NOTE — TELEPHONE ENCOUNTER
I called and spoke to the pharmacist at [facility] r/t the farxiga-he stated that it is a benefit excluded medication according to her insurance (use alternative-but not given)

## 2023-09-22 DIAGNOSIS — N18.30 STAGE 3 CHRONIC KIDNEY DISEASE, UNSPECIFIED WHETHER STAGE 3A OR 3B CKD (MULTI): Primary | ICD-10-CM

## 2023-09-22 DIAGNOSIS — R73.01 IMPAIRED FASTING GLUCOSE: ICD-10-CM

## 2023-10-26 DIAGNOSIS — L40.50 PSORIATIC ARTHRITIS (MULTI): ICD-10-CM

## 2023-10-26 DIAGNOSIS — J45.909 ASTHMA, UNSPECIFIED ASTHMA SEVERITY, UNSPECIFIED WHETHER COMPLICATED, UNSPECIFIED WHETHER PERSISTENT (HHS-HCC): Primary | ICD-10-CM

## 2023-10-26 RX ORDER — TIZANIDINE 4 MG/1
4 TABLET ORAL NIGHTLY PRN
COMMUNITY
End: 2023-10-26 | Stop reason: SDUPTHER

## 2023-10-27 RX ORDER — MOMETASONE FUROATE 100 UG/1
2 AEROSOL RESPIRATORY (INHALATION) DAILY
Qty: 0.0001 G | Refills: 3 | Status: SHIPPED | OUTPATIENT
Start: 2023-10-27

## 2023-10-27 RX ORDER — TIZANIDINE 4 MG/1
4 TABLET ORAL NIGHTLY PRN
Qty: 30 TABLET | Refills: 3 | Status: SHIPPED | OUTPATIENT
Start: 2023-10-27 | End: 2024-02-01 | Stop reason: SDUPTHER

## 2023-12-04 DIAGNOSIS — I10 BENIGN ESSENTIAL HYPERTENSION: ICD-10-CM

## 2023-12-04 DIAGNOSIS — E78.5 HYPERLIPIDEMIA, UNSPECIFIED HYPERLIPIDEMIA TYPE: ICD-10-CM

## 2023-12-05 RX ORDER — FENOFIBRATE 48 MG/1
48 TABLET, FILM COATED ORAL DAILY
Qty: 90 TABLET | Refills: 3 | Status: SHIPPED | OUTPATIENT
Start: 2023-12-05 | End: 2024-12-04

## 2023-12-05 RX ORDER — DILTIAZEM HYDROCHLORIDE 120 MG/1
120 CAPSULE, COATED, EXTENDED RELEASE ORAL DAILY
Qty: 90 CAPSULE | Refills: 3 | Status: SHIPPED | OUTPATIENT
Start: 2023-12-05 | End: 2024-12-04

## 2024-02-01 DIAGNOSIS — L40.50 PSORIATIC ARTHRITIS (MULTI): Primary | ICD-10-CM

## 2024-02-01 DIAGNOSIS — G25.81 RESTLESS LEG SYNDROME: ICD-10-CM

## 2024-02-01 DIAGNOSIS — E78.5 HYPERLIPIDEMIA, UNSPECIFIED HYPERLIPIDEMIA TYPE: ICD-10-CM

## 2024-02-01 RX ORDER — ROSUVASTATIN CALCIUM 40 MG/1
40 TABLET, COATED ORAL DAILY
Qty: 90 TABLET | Refills: 3 | Status: SHIPPED | OUTPATIENT
Start: 2024-02-01

## 2024-02-01 RX ORDER — TIZANIDINE 4 MG/1
4 TABLET ORAL NIGHTLY PRN
Qty: 30 TABLET | Refills: 3 | Status: SHIPPED | OUTPATIENT
Start: 2024-02-01

## 2024-02-01 RX ORDER — ROPINIROLE 0.5 MG/1
0.5 TABLET, FILM COATED ORAL NIGHTLY
Qty: 90 TABLET | Refills: 3 | Status: SHIPPED | OUTPATIENT
Start: 2024-02-01

## 2024-03-25 ENCOUNTER — OFFICE VISIT (OUTPATIENT)
Dept: PRIMARY CARE | Facility: CLINIC | Age: 64
End: 2024-03-25
Payer: COMMERCIAL

## 2024-03-25 ENCOUNTER — LAB (OUTPATIENT)
Dept: LAB | Facility: LAB | Age: 64
End: 2024-03-25
Payer: COMMERCIAL

## 2024-03-25 VITALS
HEART RATE: 74 BPM | SYSTOLIC BLOOD PRESSURE: 130 MMHG | HEIGHT: 68 IN | WEIGHT: 169 LBS | DIASTOLIC BLOOD PRESSURE: 65 MMHG | OXYGEN SATURATION: 96 % | BODY MASS INDEX: 25.61 KG/M2

## 2024-03-25 DIAGNOSIS — I10 BENIGN ESSENTIAL HYPERTENSION: ICD-10-CM

## 2024-03-25 DIAGNOSIS — Z00.00 ANNUAL PHYSICAL EXAM: Primary | ICD-10-CM

## 2024-03-25 DIAGNOSIS — Z00.00 ANNUAL PHYSICAL EXAM: ICD-10-CM

## 2024-03-25 DIAGNOSIS — N18.30 STAGE 3 CHRONIC KIDNEY DISEASE, UNSPECIFIED WHETHER STAGE 3A OR 3B CKD (MULTI): ICD-10-CM

## 2024-03-25 DIAGNOSIS — E03.9 HYPOTHYROIDISM, UNSPECIFIED TYPE: ICD-10-CM

## 2024-03-25 DIAGNOSIS — I48.91 ATRIAL FIBRILLATION WITH RVR (MULTI): ICD-10-CM

## 2024-03-25 LAB
25(OH)D3 SERPL-MCNC: 47 NG/ML (ref 30–100)
ERYTHROCYTE [DISTWIDTH] IN BLOOD BY AUTOMATED COUNT: 14.3 % (ref 11.5–14.5)
EST. AVERAGE GLUCOSE BLD GHB EST-MCNC: 111 MG/DL
HBA1C MFR BLD: 5.5 %
HCT VFR BLD AUTO: 41.1 % (ref 36–46)
HGB BLD-MCNC: 12.8 G/DL (ref 12–16)
MCH RBC QN AUTO: 30.9 PG (ref 26–34)
MCHC RBC AUTO-ENTMCNC: 31.1 G/DL (ref 32–36)
MCV RBC AUTO: 99 FL (ref 80–100)
NRBC BLD-RTO: 0 /100 WBCS (ref 0–0)
PLATELET # BLD AUTO: 252 X10*3/UL (ref 150–450)
RBC # BLD AUTO: 4.14 X10*6/UL (ref 4–5.2)
TSH SERPL-ACNC: 1.94 MIU/L (ref 0.44–3.98)
WBC # BLD AUTO: 6 X10*3/UL (ref 4.4–11.3)

## 2024-03-25 PROCEDURE — 80053 COMPREHEN METABOLIC PANEL: CPT

## 2024-03-25 PROCEDURE — 83036 HEMOGLOBIN GLYCOSYLATED A1C: CPT

## 2024-03-25 PROCEDURE — 82306 VITAMIN D 25 HYDROXY: CPT

## 2024-03-25 PROCEDURE — 99396 PREV VISIT EST AGE 40-64: CPT | Performed by: INTERNAL MEDICINE

## 2024-03-25 PROCEDURE — 3078F DIAST BP <80 MM HG: CPT | Performed by: INTERNAL MEDICINE

## 2024-03-25 PROCEDURE — 80061 LIPID PANEL: CPT

## 2024-03-25 PROCEDURE — 3075F SYST BP GE 130 - 139MM HG: CPT | Performed by: INTERNAL MEDICINE

## 2024-03-25 PROCEDURE — 84443 ASSAY THYROID STIM HORMONE: CPT

## 2024-03-25 PROCEDURE — 36415 COLL VENOUS BLD VENIPUNCTURE: CPT

## 2024-03-25 PROCEDURE — 85027 COMPLETE CBC AUTOMATED: CPT

## 2024-03-25 RX ORDER — ATENOLOL 25 MG/1
50 TABLET ORAL DAILY
Qty: 180 TABLET | Refills: 3 | Status: SHIPPED | OUTPATIENT
Start: 2024-03-25 | End: 2025-03-25

## 2024-03-25 RX ORDER — FOLIC ACID 1 MG/1
1 TABLET ORAL DAILY
COMMUNITY

## 2024-03-25 RX ORDER — SULFASALAZINE 500 MG/1
500 TABLET ORAL 3 TIMES DAILY
COMMUNITY

## 2024-03-25 RX ORDER — LEVOTHYROXINE SODIUM 88 UG/1
88 TABLET ORAL DAILY
Qty: 90 TABLET | Refills: 3 | Status: SHIPPED | OUTPATIENT
Start: 2024-03-25

## 2024-03-25 NOTE — PROGRESS NOTES
"Subjective   Patient ID: Sommer Guido is a 64 y.o. female who presents for the following    PHYSICAL 3/25/2024    Assessment/Plan   impaired fasting glucose:  Stable, check A1c      psoriatic arthritis possibly impacting her lungs (uses an inhaler): stable, following with dr vargas , getting immunologic infusion at this time     asthma: patient follows with dr mackenzie.      a-fib : stable on xeralto, follows with dr RILEY    CKD stage 3: GFR 50s    On jardiance      hypothyroid: check TSH       Follow up with GYN and follow up for mammogram     Colonoscopy dr vollweiller december 2023 with 3 year follow up     HPI  with HTN, HLD, hypothyroid, DIANNA (CPAP), CKD3, and Chronic A. Fib (xarelto and atenolol); hx COVID here for the following       psoriatic arthritis: following with rheumatology. patient mentions that she was placed on prednisone for her \"lungs\" as she was told that her psoriatic arthritis is impacting her lungs. she gets aria gtt but she is no longer on prednisone      A. fib: Stable, on Xarelto and atenolol, follows with Dr. RILEY     DIANNA: stable, on CPAP, follows with dr mackenzie.      CKD3: Patient has been taking off diuretics, advised to avoid NSAIDS. patient has small amounts of protein in the urine. renal ultrasound may 28, 2020 is wnl.      hypothyroid: patient denies any hypo or hypothyroid symptoms. patient denies any palpitations, diarrhea or constipation     HTN: stable,      HLD: Patient denies any muscle aches and is tolerating statin therapy    Past surgical hx:  tonsil stones/bilateral tonsillectomy Dr Skinner 9/14/2022    Visit Vitals  /82 (BP Location: Left arm, Patient Position: Sitting, BP Cuff Size: Adult)   Pulse 74   Ht 1.727 m (5' 8\")   Wt 76.7 kg (169 lb)   SpO2 96%   BMI 25.70 kg/m²   Smoking Status Former   BSA 1.92 m²     PHYSICAL EXAM   General appearance: Alert and in no acute distress. speech is clear and coherent  HEENT: Sclera and conjunctiva white, EOMI, uvela midline, no mouth " lesions. PERRLA,  nasal turbinates are not swollen without exudate. TM's Tucker with cone of light, external ear canal with scant cerumen. No head trauma  Neck: no carotid bruits or thyromegaly. no lymphadenopathy   Respiratory : No respiratory distress, normal respiratory rhythm and effort. Clear bilateral breath sounds. No wheezing or rhonchi.   Cardiovascular: heart rate regular, S1, S2. no murmurs. no Lower extremity edema  Skin inspection: Normal skin color and pigmentation, normal skin turgor and no visible rash, induration, or cellulitis  MSK: 5/5 strength upper and lower extremities without gait abnormalities. no loss of muscle mass   Neuro: 2-12 CN grossly intact.  no slurred speech. no lateralizing deficit  Psychiatric Orientation: Oriented to person, place, and time. no depression, homicidal or suicidal thoughts, normal affect  Abdomen: soft, none tender, none distended. no organomegaly        REVIEW OF SYSTEMS   Constitutional: not feeling tired and no fever, chills or sweats. Denies weight loss    HEENT: no earache and no sore throat. no blurred vision and or double vision. no headache  Cardiovascular: no exertional chest pain, no palpitations, no lower extremity edema and no intermittent leg claudication.   Lungs: Denies shortness of breath, exertional dyspnea, wheezing  Gastrointestinal: no change in bowel habits, no diarrhea, no nausea, no vomiting and no abdominal pain. Denies Melena, brbpr or dark stool  Musculoskeletal: no myalgias, no muscle weakness and no limb swelling.   Skin: no rashes, no change in skin color and pigmentation and no skin lumps.   Neurological: no headaches, no seizures, no numbness, no lateralizing deficits and no fainting.   Psychiatric: no depression and no anxiety.   Urine: denies polyuria, hematuria, dysuria   Endocrine: no cold intolerance, no heat intolerance      Allergies   Allergen Reactions    Losartan Unknown       Current Outpatient Medications   Medication Sig  Dispense Refill    acetaminophen (Tylenol Extra Strength) 500 mg tablet Take 1 tablet (500 mg) by mouth every 4 hours if needed. 4-6 hours      cetirizine (ZyrTEC) 10 mg tablet Take 1 tablet (10 mg) by mouth once daily.      dilTIAZem CD (Cartia XT) 120 mg 24 hr capsule Take 1 capsule (120 mg) by mouth once daily. 90 capsule 3    empagliflozin (Jardiance) 10 mg Take 1 tablet (10 mg) by mouth once daily. 90 tablet 3    fenofibrate (Tricor) 48 mg tablet Take 1 tablet (48 mg) by mouth once daily. 90 tablet 3    levothyroxine (Synthroid, Levoxyl) 88 mcg tablet Take 1 tablet (88 mcg) by mouth once daily.      mometasone (Asmanex HFA) 100 mcg/actuation HFA aerosol inhaler Inhale 2 puffs once daily. 0.0001 g 3    multivitamin with minerals iron-free (multivitamin-iron-minerals-folic acid) Take 1 tablet by mouth once daily.      omeprazole (PriLOSEC) 40 mg DR capsule Take 1 capsule (40 mg) by mouth once daily in the morning. Take before meals. 90 capsule 3    predniSONE (Deltasone) 5 mg tablet Take 1 tablet (5 mg) by mouth once daily.      rOPINIRole (Requip) 0.5 mg tablet Take 1 tablet (0.5 mg) by mouth once daily at bedtime. 90 tablet 3    rosuvastatin (Crestor) 40 mg tablet Take 1 tablet (40 mg) by mouth once daily. 90 tablet 3    tiZANidine (Zanaflex) 4 mg tablet Take 1 tablet (4 mg) by mouth as needed at bedtime for muscle spasms. 30 tablet 3    traZODone (Desyrel) 50 mg tablet Take 1 tablet (50 mg) by mouth as needed at bedtime.      atenolol (Tenormin) 25 mg tablet Take 2 tablets (50 mg) by mouth once daily. 1/2 tab prn 180 tablet 1    celecoxib (CeleBREX) 200 mg capsule Take 1-2 capsules (200-400 mg) by mouth if needed.      clobetasol (Temovate) 0.05 % cream Apply 1 Application topically 2 times a day. Apply to affected area      folic acid (Folvite) 1 mg tablet Take 1 tablet (1 mg) by mouth once daily.      rivaroxaban (Xarelto) 20 mg tablet Take 1 tablet (20 mg) by mouth once daily in the evening. Take with  meals. Take with food 90 tablet 1    sulfaSALAzine (Azulfidine) 500 mg tablet Take 1 tablet (500 mg) by mouth 3 times a day.      valACYclovir (Valtrex) 1 gram tablet Take 1 tablet (1,000 mg) by mouth 2 times a day. For 10 days then reduce to 1 tab daily thereafter       No current facility-administered medications for this visit.       Objective     No visits with results within 4 Month(s) from this visit.   Latest known visit with results is:   Lab on 09/11/2023   Component Date Value Ref Range Status    WBC 09/11/2023 11.3  4.4 - 11.3 x10E9/L Final    nRBC 09/11/2023 0.0  0.0 - 0.0 /100 WBC Final    RBC 09/11/2023 4.11  4.00 - 5.20 x10E12/L Final    Hemoglobin 09/11/2023 13.0  12.0 - 16.0 g/dL Final    Hematocrit 09/11/2023 42.1  36.0 - 46.0 % Final    MCV 09/11/2023 102 (H)  80 - 100 fL Final    MCHC 09/11/2023 30.9 (L)  32.0 - 36.0 g/dL Final    Platelets 09/11/2023 260  150 - 450 x10E9/L Final    RDW 09/11/2023 13.3  11.5 - 14.5 % Final    Glucose 09/11/2023 72 (L)  74 - 99 mg/dL Final    Sodium 09/11/2023 144  136 - 145 mmol/L Final    Potassium 09/11/2023 4.3  3.5 - 5.3 mmol/L Final    Chloride 09/11/2023 106  98 - 107 mmol/L Final    Bicarbonate 09/11/2023 30  21 - 32 mmol/L Final    Anion Gap 09/11/2023 12  10 - 20 mmol/L Final    Urea Nitrogen 09/11/2023 26 (H)  6 - 23 mg/dL Final    Creatinine 09/11/2023 0.92  0.50 - 1.05 mg/dL Final    GFR Female 09/11/2023 70  >90 mL/min/1.73m2 Final    Calcium 09/11/2023 10.5  8.6 - 10.6 mg/dL Final    Albumin 09/11/2023 4.5  3.4 - 5.0 g/dL Final    Alkaline Phosphatase 09/11/2023 47  33 - 136 U/L Final    Total Protein 09/11/2023 6.9  6.4 - 8.2 g/dL Final    AST 09/11/2023 22  9 - 39 U/L Final    Total Bilirubin 09/11/2023 0.9  0.0 - 1.2 mg/dL Final    ALT (SGPT) 09/11/2023 32  7 - 45 U/L Final    Hemoglobin A1C 09/11/2023 5.7 (A)  % Final    Estimated Average Glucose 09/11/2023 117  MG/DL Final    TSH 09/11/2023 1.87  0.44 - 3.98 mIU/L Final    Vitamin D, 25-Hydroxy  09/11/2023 35  ng/mL Final    Cholesterol 09/11/2023 181  0 - 199 mg/dL Final    HDL 09/11/2023 79.3  mg/dL Final    Cholesterol/HDL Ratio 09/11/2023 2.3   Final    LDL 09/11/2023 76  0 - 99 mg/dL Final    VLDL 09/11/2023 26  0 - 40 mg/dL Final    Triglycerides 09/11/2023 130  0 - 149 mg/dL Final    Protein, Ur 09/11/2023 14  5 - 24 mg/dL Final    Creatinine, Urine 09/11/2023 173.0  20.0 - 320.0 mg/dL Final    Prot/Creat, Ur 09/11/2023 0.08  0.00 - 0.17 mg/mg Creat Final       Radiology: Reviewed imaging in powerchart.  No results found.    Family History   Problem Relation Name Age of Onset    Atrial fibrillation Father      Atrial fibrillation Brother      Atrial fibrillation Mother's Sister      Diabetes Mother's Sister      Alzheimer's disease Maternal Grandmother       Social History     Socioeconomic History    Marital status:      Spouse name: None    Number of children: None    Years of education: None    Highest education level: None   Occupational History    None   Tobacco Use    Smoking status: Former     Types: Cigarettes    Smokeless tobacco: None   Substance and Sexual Activity    Alcohol use: Not Currently    Drug use: Never    Sexual activity: None   Other Topics Concern    None   Social History Narrative    None     Social Determinants of Health     Financial Resource Strain: Not on file   Food Insecurity: Not on file   Transportation Needs: Not on file   Physical Activity: Not on file   Stress: Not on file   Social Connections: Not on file   Intimate Partner Violence: Not on file   Housing Stability: Not on file     Past Medical History:   Diagnosis Date    Contusion of right front wall of thorax, initial encounter     Bruised rib, right, initial encounter    Cutaneous abscess of face 04/14/2015    Abscess, eyebrow    Essential (primary) hypertension 06/15/2021    Benign essential hypertension    Hyperlipidemia, unspecified 09/13/2022    Hyperlipidemia    Hypothyroidism, unspecified  09/13/2022    Hypothyroidism    Toxic effect of venom of bees, accidental (unintentional), initial encounter 08/29/2019    Bee sting    Unspecified atrial fibrillation (CMS/HCC) 08/21/2019    Atrial fibrillation with RVR     Past Surgical History:   Procedure Laterality Date    OTHER SURGICAL HISTORY  11/26/2018    Ablation    OTHER SURGICAL HISTORY  11/26/2018    Tubal ligation       Charting was completed using voice recognition technology and may include unintended errors.

## 2024-03-26 LAB
ALBUMIN SERPL BCP-MCNC: 4.6 G/DL (ref 3.4–5)
ALP SERPL-CCNC: 63 U/L (ref 33–136)
ALT SERPL W P-5'-P-CCNC: 22 U/L (ref 7–45)
ANION GAP SERPL CALC-SCNC: 16 MMOL/L (ref 10–20)
AST SERPL W P-5'-P-CCNC: 26 U/L (ref 9–39)
BILIRUB SERPL-MCNC: 0.6 MG/DL (ref 0–1.2)
BUN SERPL-MCNC: 22 MG/DL (ref 6–23)
CALCIUM SERPL-MCNC: 10.3 MG/DL (ref 8.6–10.6)
CHLORIDE SERPL-SCNC: 107 MMOL/L (ref 98–107)
CHOLEST SERPL-MCNC: 187 MG/DL (ref 0–199)
CHOLESTEROL/HDL RATIO: 2.8
CO2 SERPL-SCNC: 27 MMOL/L (ref 21–32)
CREAT SERPL-MCNC: 0.91 MG/DL (ref 0.5–1.05)
EGFRCR SERPLBLD CKD-EPI 2021: 71 ML/MIN/1.73M*2
GLUCOSE SERPL-MCNC: 86 MG/DL (ref 74–99)
HDLC SERPL-MCNC: 65.9 MG/DL
LDLC SERPL CALC-MCNC: 91 MG/DL
NON HDL CHOLESTEROL: 121 MG/DL (ref 0–149)
POTASSIUM SERPL-SCNC: 4.5 MMOL/L (ref 3.5–5.3)
PROT SERPL-MCNC: 6.9 G/DL (ref 6.4–8.2)
SODIUM SERPL-SCNC: 145 MMOL/L (ref 136–145)
TRIGL SERPL-MCNC: 149 MG/DL (ref 0–149)
VLDL: 30 MG/DL (ref 0–40)

## 2024-07-23 ENCOUNTER — TELEPHONE (OUTPATIENT)
Dept: PRIMARY CARE | Facility: CLINIC | Age: 64
End: 2024-07-23
Payer: COMMERCIAL

## 2024-07-23 DIAGNOSIS — L40.50 PSORIATIC ARTHRITIS (MULTI): ICD-10-CM

## 2024-07-24 RX ORDER — TIZANIDINE 4 MG/1
4 TABLET ORAL NIGHTLY PRN
Qty: 30 TABLET | Refills: 3 | Status: SHIPPED | OUTPATIENT
Start: 2024-07-24

## 2024-09-06 DIAGNOSIS — K21.9 GASTROESOPHAGEAL REFLUX DISEASE WITHOUT ESOPHAGITIS: ICD-10-CM

## 2024-09-06 RX ORDER — OMEPRAZOLE 40 MG/1
CAPSULE, DELAYED RELEASE ORAL
Qty: 90 CAPSULE | Refills: 3 | Status: SHIPPED | OUTPATIENT
Start: 2024-09-06

## 2024-09-18 DIAGNOSIS — N18.30 STAGE 3 CHRONIC KIDNEY DISEASE, UNSPECIFIED WHETHER STAGE 3A OR 3B CKD (MULTI): ICD-10-CM

## 2024-09-18 DIAGNOSIS — R73.01 IMPAIRED FASTING GLUCOSE: ICD-10-CM

## 2024-09-18 RX ORDER — EMPAGLIFLOZIN 10 MG/1
10 TABLET, FILM COATED ORAL DAILY
Qty: 90 TABLET | Refills: 3 | Status: SHIPPED | OUTPATIENT
Start: 2024-09-18

## 2024-09-30 ENCOUNTER — APPOINTMENT (OUTPATIENT)
Dept: PRIMARY CARE | Facility: CLINIC | Age: 64
End: 2024-09-30
Payer: COMMERCIAL

## 2024-09-30 VITALS
WEIGHT: 168 LBS | OXYGEN SATURATION: 97 % | HEART RATE: 80 BPM | SYSTOLIC BLOOD PRESSURE: 132 MMHG | BODY MASS INDEX: 25.46 KG/M2 | DIASTOLIC BLOOD PRESSURE: 70 MMHG | HEIGHT: 68 IN

## 2024-09-30 DIAGNOSIS — E03.9 HYPOTHYROIDISM, UNSPECIFIED TYPE: ICD-10-CM

## 2024-09-30 DIAGNOSIS — L40.50 PSORIATIC ARTHRITIS (MULTI): ICD-10-CM

## 2024-09-30 DIAGNOSIS — N18.30 STAGE 3 CHRONIC KIDNEY DISEASE, UNSPECIFIED WHETHER STAGE 3A OR 3B CKD (MULTI): Primary | ICD-10-CM

## 2024-09-30 DIAGNOSIS — E78.5 HYPERLIPIDEMIA, UNSPECIFIED HYPERLIPIDEMIA TYPE: ICD-10-CM

## 2024-09-30 DIAGNOSIS — R73.01 IMPAIRED FASTING GLUCOSE: ICD-10-CM

## 2024-09-30 PROCEDURE — 99214 OFFICE O/P EST MOD 30 MIN: CPT | Performed by: INTERNAL MEDICINE

## 2024-09-30 PROCEDURE — 3075F SYST BP GE 130 - 139MM HG: CPT | Performed by: INTERNAL MEDICINE

## 2024-09-30 PROCEDURE — 3008F BODY MASS INDEX DOCD: CPT | Performed by: INTERNAL MEDICINE

## 2024-09-30 PROCEDURE — 3078F DIAST BP <80 MM HG: CPT | Performed by: INTERNAL MEDICINE

## 2024-09-30 NOTE — PROGRESS NOTES
"Subjective   Patient ID: Sommer Guido is a 64 y.o. female who presents for the following  Chronic disease follow up (9/30/24)  PHYSICAL 3/25/2024    Assessment/Plan   Walter: dehydration likely hydrate for 1 week and repeat bmp and urine albumin    impaired fasting glucose:  Stable, check A1c      psoriatic arthritis possibly impacting her lungs (uses an inhaler): stable, following with dr vargas , getting immunologic infusion at this time     asthma: patient follows with dr mackenzie.      a-fib : stable on xeralto, follows with dr RILEY    CKD stage 3: GFR 50s    On jardiance      hypothyroid: check TSH       Follow up with GYN and follow up for mammogram     Colonoscopy dr vollweiller december 2023 with 3 year follow up     HPI  with HTN, HLD, hypothyroid, DIANNA (CPAP), CKD3, and Chronic A. Fib (xarelto and atenolol); hx COVID here for the following      Walter noted GFR 45 with an elevated BUN. Recommend to repeat blood work next week while hydrated.        psoriatic arthritis: following with rheumatology. patient mentions that she was placed on prednisone for her \"lungs\" as she was told that her psoriatic arthritis is impacting her lungs. she gets aria gtt but she is no longer on prednisone      A. fib: Stable, on Xarelto and atenolol, follows with Dr. RILEY     DIANNA: stable, on CPAP, follows with dr mackenzie.      CKD3: Patient has been taking off diuretics, advised to avoid NSAIDS. patient has small amounts of protein in the urine. renal ultrasound may 28, 2020 is wnl.      hypothyroid: patient denies any hypo or hypothyroid symptoms. patient denies any palpitations, diarrhea or constipation     HTN: stable,      HLD: Patient denies any muscle aches and is tolerating statin therapy    Past surgical hx:  tonsil stones/bilateral tonsillectomy Dr Skinner 9/14/2022    Visit Vitals  /70 (BP Location: Right arm, Patient Position: Sitting, BP Cuff Size: Adult)   Pulse 80   Ht 1.727 m (5' 8\")   Wt 76.2 kg (168 lb)   SpO2 97%   BMI " 25.54 kg/m²   Smoking Status Former   BSA 1.91 m²     PHYSICAL EXAM   General appearance: Alert and in no acute distress. speech is clear and coherent  HEENT: Sclera and conjunctiva white, EOMI,    Respiratory : No respiratory distress, normal respiratory rhythm and effort. Clear bilateral breath sounds. No wheezing or rhonchi.   Cardiovascular: heart rate regular, S1, S2. no murmurs. no Lower extremity edema  Skin inspection: Normal skin color and pigmentation, normal skin turgor and no visible rash, induration, or cellulitis  MSK: 5/5 strength upper and lower extremities without gait abnormalities. no loss of muscle mass   Neuro: 2-12 CN grossly intact.  no slurred speech. no lateralizing deficit  Psychiatric Orientation: Oriented to person, place, and time. no depression, homicidal or suicidal thoughts, normal affect         REVIEW OF SYSTEMS   Constitutional: not feeling tired and no fever, chills or sweats. Denies weight loss    no headache  Cardiovascular: no exertional chest pain, no palpitations, no lower extremity edema    Lungs: Denies shortness of breath, exertional dyspnea, wheezing  Gastrointestinal: no change in bowel habits, no diarrhea, no nausea, no vomiting and no abdominal pain.    Musculoskeletal: no myalgias, no muscle weakness and no limb swelling.   Skin: no rashes, no change in skin color and pigmentation and no skin lumps.   Neurological: no headaches, no seizures, no numbness, no lateralizing deficits and no fainting.   Psychiatric: no depression and no anxiety.   Urine: denies polyuria, hematuria, dysuria -       Allergies   Allergen Reactions    Losartan Unknown       Current Outpatient Medications   Medication Sig Dispense Refill    acetaminophen (Tylenol Extra Strength) 500 mg tablet Take 1 tablet (500 mg) by mouth every 4 hours if needed. 4-6 hours      atenolol (Tenormin) 25 mg tablet Take 2 tablets (50 mg) by mouth once daily. 1/2 tab prn 180 tablet 3    celecoxib (CeleBREX) 200 mg  capsule Take 1-2 capsules (200-400 mg) by mouth if needed.      cetirizine (ZyrTEC) 10 mg tablet Take 1 tablet (10 mg) by mouth once daily.      clobetasol (Temovate) 0.05 % cream Apply 1 Application topically 2 times a day. Apply to affected area      dilTIAZem CD (Cartia XT) 120 mg 24 hr capsule Take 1 capsule (120 mg) by mouth once daily. 90 capsule 3    fenofibrate (Tricor) 48 mg tablet Take 1 tablet (48 mg) by mouth once daily. 90 tablet 3    folic acid (Folvite) 1 mg tablet Take 1 tablet (1 mg) by mouth once daily.      Jardiance 10 mg TAKE ONE TABLET (10 MG) BY MOUTH ONCE DAILY. 90 tablet 3    levothyroxine (Synthroid, Levoxyl) 88 mcg tablet Take 1 tablet (88 mcg) by mouth once daily. 90 tablet 3    mometasone (Asmanex HFA) 100 mcg/actuation HFA aerosol inhaler Inhale 2 puffs once daily. 0.0001 g 3    multivitamin with minerals iron-free (multivitamin-iron-minerals-folic acid) Take 1 tablet by mouth once daily.      omeprazole (PriLOSEC) 40 mg DR capsule TAKE ONE CAPSULE BY MOUTH OCNE DAILY IN THE MORNING BEFORE MEALS 90 capsule 3    predniSONE (Deltasone) 5 mg tablet Take 1 tablet (5 mg) by mouth once daily.      rivaroxaban (Xarelto) 20 mg tablet Take 1 tablet (20 mg) by mouth once daily in the evening. Take with meals. Take with food 90 tablet 3    rOPINIRole (Requip) 0.5 mg tablet Take 1 tablet (0.5 mg) by mouth once daily at bedtime. 90 tablet 3    rosuvastatin (Crestor) 40 mg tablet Take 1 tablet (40 mg) by mouth once daily. 90 tablet 3    sulfaSALAzine (Azulfidine) 500 mg tablet Take 1 tablet (500 mg) by mouth 3 times a day.      tiZANidine (Zanaflex) 4 mg tablet Take 1 tablet (4 mg) by mouth as needed at bedtime for muscle spasms. 30 tablet 3    traZODone (Desyrel) 50 mg tablet Take 1 tablet (50 mg) by mouth as needed at bedtime.      valACYclovir (Valtrex) 1 gram tablet Take 1 tablet (1,000 mg) by mouth 2 times a day. For 10 days then reduce to 1 tab daily thereafter       No current  facility-administered medications for this visit.       Objective     No visits with results within 4 Month(s) from this visit.   Latest known visit with results is:   Lab on 03/25/2024   Component Date Value Ref Range Status    WBC 03/25/2024 6.0  4.4 - 11.3 x10*3/uL Final    nRBC 03/25/2024 0.0  0.0 - 0.0 /100 WBCs Final    RBC 03/25/2024 4.14  4.00 - 5.20 x10*6/uL Final    Hemoglobin 03/25/2024 12.8  12.0 - 16.0 g/dL Final    Hematocrit 03/25/2024 41.1  36.0 - 46.0 % Final    MCV 03/25/2024 99  80 - 100 fL Final    MCH 03/25/2024 30.9  26.0 - 34.0 pg Final    MCHC 03/25/2024 31.1 (L)  32.0 - 36.0 g/dL Final    RDW 03/25/2024 14.3  11.5 - 14.5 % Final    Platelets 03/25/2024 252  150 - 450 x10*3/uL Final    Glucose 03/25/2024 86  74 - 99 mg/dL Final    Sodium 03/25/2024 145  136 - 145 mmol/L Final    Potassium 03/25/2024 4.5  3.5 - 5.3 mmol/L Final    Chloride 03/25/2024 107  98 - 107 mmol/L Final    Bicarbonate 03/25/2024 27  21 - 32 mmol/L Final    Anion Gap 03/25/2024 16  10 - 20 mmol/L Final    Urea Nitrogen 03/25/2024 22  6 - 23 mg/dL Final    Creatinine 03/25/2024 0.91  0.50 - 1.05 mg/dL Final    eGFR 03/25/2024 71  >60 mL/min/1.73m*2 Final    Calcium 03/25/2024 10.3  8.6 - 10.6 mg/dL Final    Albumin 03/25/2024 4.6  3.4 - 5.0 g/dL Final    Alkaline Phosphatase 03/25/2024 63  33 - 136 U/L Final    Total Protein 03/25/2024 6.9  6.4 - 8.2 g/dL Final    AST 03/25/2024 26  9 - 39 U/L Final    Bilirubin, Total 03/25/2024 0.6  0.0 - 1.2 mg/dL Final    ALT 03/25/2024 22  7 - 45 U/L Final    Hemoglobin A1C 03/25/2024 5.5  see below % Final    Estimated Average Glucose 03/25/2024 111  Not Established mg/dL Final    Cholesterol 03/25/2024 187  0 - 199 mg/dL Final    HDL-Cholesterol 03/25/2024 65.9  mg/dL Final    Cholesterol/HDL Ratio 03/25/2024 2.8   Final    LDL Calculated 03/25/2024 91  <=99 mg/dL Final    VLDL 03/25/2024 30  0 - 40 mg/dL Final    Triglycerides 03/25/2024 149  0 - 149 mg/dL Final    Non HDL  Cholesterol 03/25/2024 121  0 - 149 mg/dL Final    Thyroid Stimulating Hormone 03/25/2024 1.94  0.44 - 3.98 mIU/L Final    Vitamin D, 25-Hydroxy, Total 03/25/2024 47  30 - 100 ng/mL Final       Radiology: Reviewed imaging in powerchart.  No results found.    Family History   Problem Relation Name Age of Onset    Atrial fibrillation Father      Atrial fibrillation Brother      Atrial fibrillation Mother's Sister      Diabetes Mother's Sister      Alzheimer's disease Maternal Grandmother       Social History     Socioeconomic History    Marital status:    Tobacco Use    Smoking status: Former     Types: Cigarettes   Substance and Sexual Activity    Alcohol use: Not Currently    Drug use: Never     Past Medical History:   Diagnosis Date    Contusion of right front wall of thorax, initial encounter     Bruised rib, right, initial encounter    Cutaneous abscess of face 04/14/2015    Abscess, eyebrow    Essential (primary) hypertension 06/15/2021    Benign essential hypertension    Hyperlipidemia, unspecified 09/13/2022    Hyperlipidemia    Hypothyroidism, unspecified 09/13/2022    Hypothyroidism    Toxic effect of venom of bees, accidental (unintentional), initial encounter 08/29/2019    Bee sting    Unspecified atrial fibrillation (Multi) 08/21/2019    Atrial fibrillation with RVR     Past Surgical History:   Procedure Laterality Date    OTHER SURGICAL HISTORY  11/26/2018    Ablation    OTHER SURGICAL HISTORY  11/26/2018    Tubal ligation       Charting was completed using voice recognition technology and may include unintended errors.

## 2024-10-22 ENCOUNTER — LAB (OUTPATIENT)
Dept: LAB | Facility: LAB | Age: 64
End: 2024-10-22
Payer: COMMERCIAL

## 2024-10-22 DIAGNOSIS — L40.50 PSORIATIC ARTHRITIS (MULTI): ICD-10-CM

## 2024-10-22 DIAGNOSIS — R73.01 IMPAIRED FASTING GLUCOSE: ICD-10-CM

## 2024-10-22 DIAGNOSIS — N18.30 STAGE 3 CHRONIC KIDNEY DISEASE, UNSPECIFIED WHETHER STAGE 3A OR 3B CKD (MULTI): ICD-10-CM

## 2024-10-22 DIAGNOSIS — E03.9 HYPOTHYROIDISM, UNSPECIFIED TYPE: ICD-10-CM

## 2024-10-22 DIAGNOSIS — E78.5 HYPERLIPIDEMIA, UNSPECIFIED HYPERLIPIDEMIA TYPE: ICD-10-CM

## 2024-10-22 LAB
ANION GAP SERPL CALC-SCNC: 12 MMOL/L (ref 10–20)
BUN SERPL-MCNC: 21 MG/DL (ref 6–23)
CALCIUM SERPL-MCNC: 11 MG/DL (ref 8.6–10.6)
CHLORIDE SERPL-SCNC: 107 MMOL/L (ref 98–107)
CO2 SERPL-SCNC: 27 MMOL/L (ref 21–32)
CREAT SERPL-MCNC: 1.11 MG/DL (ref 0.5–1.05)
CREAT UR-MCNC: 183 MG/DL (ref 20–320)
EGFRCR SERPLBLD CKD-EPI 2021: 56 ML/MIN/1.73M*2
ERYTHROCYTE [DISTWIDTH] IN BLOOD BY AUTOMATED COUNT: 13.4 % (ref 11.5–14.5)
GLUCOSE SERPL-MCNC: 95 MG/DL (ref 74–99)
HCT VFR BLD AUTO: 42 % (ref 36–46)
HGB BLD-MCNC: 13.5 G/DL (ref 12–16)
MCH RBC QN AUTO: 31.4 PG (ref 26–34)
MCHC RBC AUTO-ENTMCNC: 32.1 G/DL (ref 32–36)
MCV RBC AUTO: 98 FL (ref 80–100)
MICROALBUMIN UR-MCNC: 21.9 MG/L
MICROALBUMIN/CREAT UR: 12 UG/MG CREAT
NRBC BLD-RTO: 0 /100 WBCS (ref 0–0)
PLATELET # BLD AUTO: 302 X10*3/UL (ref 150–450)
POTASSIUM SERPL-SCNC: 4.6 MMOL/L (ref 3.5–5.3)
RBC # BLD AUTO: 4.3 X10*6/UL (ref 4–5.2)
SODIUM SERPL-SCNC: 141 MMOL/L (ref 136–145)
WBC # BLD AUTO: 5.5 X10*3/UL (ref 4.4–11.3)

## 2024-10-22 PROCEDURE — 82043 UR ALBUMIN QUANTITATIVE: CPT

## 2024-10-22 PROCEDURE — 36415 COLL VENOUS BLD VENIPUNCTURE: CPT

## 2024-10-22 PROCEDURE — 82570 ASSAY OF URINE CREATININE: CPT

## 2024-10-22 PROCEDURE — 85027 COMPLETE CBC AUTOMATED: CPT

## 2024-10-22 PROCEDURE — 80048 BASIC METABOLIC PNL TOTAL CA: CPT

## 2024-10-24 ENCOUNTER — TELEPHONE (OUTPATIENT)
Dept: PRIMARY CARE | Facility: CLINIC | Age: 64
End: 2024-10-24
Payer: COMMERCIAL

## 2024-10-24 NOTE — TELEPHONE ENCOUNTER
----- Message from Jhonathan Figueroa sent at 10/24/2024  7:30 AM EDT -----  Recommend discussion about kidney disease in 3 weeks telephone appointment would be fine

## 2024-10-29 ENCOUNTER — APPOINTMENT (OUTPATIENT)
Dept: PRIMARY CARE | Facility: CLINIC | Age: 64
End: 2024-10-29
Payer: COMMERCIAL

## 2024-10-29 DIAGNOSIS — N17.9 AKI (ACUTE KIDNEY INJURY) (CMS-HCC): Primary | ICD-10-CM

## 2024-10-29 DIAGNOSIS — E83.52 HYPERCALCEMIA: ICD-10-CM

## 2024-10-29 DIAGNOSIS — N18.9 CHRONIC KIDNEY DISEASE, UNSPECIFIED CKD STAGE: ICD-10-CM

## 2024-10-29 PROCEDURE — 99441 PR PHYS/QHP TELEPHONE EVALUATION 5-10 MIN: CPT | Performed by: INTERNAL MEDICINE

## 2024-11-25 ENCOUNTER — TELEPHONE (OUTPATIENT)
Dept: PRIMARY CARE | Facility: CLINIC | Age: 64
End: 2024-11-25
Payer: COMMERCIAL

## 2024-11-25 DIAGNOSIS — E78.5 HYPERLIPIDEMIA, UNSPECIFIED HYPERLIPIDEMIA TYPE: ICD-10-CM

## 2024-11-25 NOTE — TELEPHONE ENCOUNTER
PT is looking for BW results    She thought she had a virtual scheduled for tomorrow.    She said if results are in she would like a detailed voice mail left if possible regarding them.

## 2024-11-27 ENCOUNTER — LAB (OUTPATIENT)
Dept: LAB | Facility: LAB | Age: 64
End: 2024-11-27
Payer: COMMERCIAL

## 2024-11-27 DIAGNOSIS — N17.9 AKI (ACUTE KIDNEY INJURY) (CMS-HCC): ICD-10-CM

## 2024-11-27 DIAGNOSIS — E83.52 HYPERCALCEMIA: ICD-10-CM

## 2024-11-27 LAB
ANION GAP SERPL CALC-SCNC: 13 MMOL/L (ref 10–20)
BUN SERPL-MCNC: 24 MG/DL (ref 6–23)
CA-I BLD-SCNC: 1.27 MMOL/L (ref 1.1–1.33)
CALCIUM SERPL-MCNC: 10.1 MG/DL (ref 8.6–10.6)
CHLORIDE SERPL-SCNC: 106 MMOL/L (ref 98–107)
CO2 SERPL-SCNC: 28 MMOL/L (ref 21–32)
CREAT SERPL-MCNC: 0.97 MG/DL (ref 0.5–1.05)
EGFRCR SERPLBLD CKD-EPI 2021: 66 ML/MIN/1.73M*2
GLUCOSE SERPL-MCNC: 90 MG/DL (ref 74–99)
POTASSIUM SERPL-SCNC: 4 MMOL/L (ref 3.5–5.3)
PTH-INTACT SERPL-MCNC: 45.4 PG/ML (ref 18.5–88)
SODIUM SERPL-SCNC: 143 MMOL/L (ref 136–145)

## 2024-11-27 PROCEDURE — 82330 ASSAY OF CALCIUM: CPT

## 2024-11-27 PROCEDURE — 36415 COLL VENOUS BLD VENIPUNCTURE: CPT

## 2024-11-27 PROCEDURE — 80048 BASIC METABOLIC PNL TOTAL CA: CPT

## 2024-11-27 PROCEDURE — 83970 ASSAY OF PARATHORMONE: CPT

## 2024-12-02 ENCOUNTER — APPOINTMENT (OUTPATIENT)
Dept: PRIMARY CARE | Facility: CLINIC | Age: 64
End: 2024-12-02
Payer: COMMERCIAL

## 2024-12-02 DIAGNOSIS — N18.30 STAGE 3 CHRONIC KIDNEY DISEASE, UNSPECIFIED WHETHER STAGE 3A OR 3B CKD (MULTI): Primary | ICD-10-CM

## 2024-12-02 PROCEDURE — 99441 PR PHYS/QHP TELEPHONE EVALUATION 5-10 MIN: CPT | Performed by: INTERNAL MEDICINE

## 2024-12-02 NOTE — PROGRESS NOTES
"Subjective   Patient ID: Sommer Guido is a 64 y.o. female who presents for the following  Telephone CKD    Virtual or Telephone Consent    An interactive audio and video telecommunication system which permits real time communications between the patient (at the originating site) and provider (at the distant site) was utilized to provide this telehealth service.   Verbal consent was requested and obtained from Sommer Guido on this date, 12/02/24 for a telehealth visit.     Chronic disease follow up (9/30/24)  PHYSICAL 3/25/2024    Assessment/Plan    CKD stage 2 or 3a :   GFR 60s-50s  Albumin/cr wnl   Bmp  Renal ultrasound 11/19/24 Increased bilateral renal cortical echogenicity possibly indicating medical renal disease.   Jardiance 10mg daily     Hypercalcemia with PTH and I calcium wnl: stop taking calcium supplements     Other medical issues see below     impaired fasting glucose:  Stable, check A1c      psoriatic arthritis possibly impacting her lungs (uses an inhaler): stable, following with dr vargas , getting immunologic infusion at this time     asthma: patient follows with dr mackenzie.      a-fib : stable on xeralto, follows with dr RILEY     hypothyroid: check TSH       Follow up with GYN and follow up for mammogram     Colonoscopy dr vollweiller december 2023 with 3 year follow up     HPI  with HTN, HLD, hypothyroid, DIANNA (CPAP), CKD3, and Chronic A. Fib (xarelto and atenolol); hx COVID here for the following      Walter noted GFR 45 with an elevated BUN. Recommend to repeat blood work did show improvement to this. She does have medical renal disease noted on her ultrasound but her GFR did improve to 60s.     Hypercalcemia: patient does take calcium supplements, which she stopped. Her pth was wnl and I janeth is wnl.     Other medical issues see below.        psoriatic arthritis: following with rheumatology. patient mentions that she was placed on prednisone for her \"lungs\" as she was told that her psoriatic " arthritis is impacting her lungs. she gets aria gtt but she is no longer on prednisone      A. fib: Stable, on Xarelto and atenolol, follows with Dr. RILEY     DIANNA: stable, on CPAP, follows with dr mackenzie.      CKD3: Patient has been taking off diuretics, advised to avoid NSAIDS. patient has small amounts of protein in the urine. renal ultrasound may 28, 2020 is wnl.      hypothyroid: patient denies any hypo or hypothyroid symptoms. patient denies any palpitations, diarrhea or constipation     HTN: stable,      HLD: Patient denies any muscle aches and is tolerating statin therapy    Past surgical hx:  tonsil stones/bilateral tonsillectomy Dr Skinner 9/14/2022    Visit Vitals  Smoking Status Former     PHYSICAL EXAM            REVIEW OF SYSTEMS          Allergies   Allergen Reactions    Losartan Unknown       Current Outpatient Medications   Medication Sig Dispense Refill    acetaminophen (Tylenol Extra Strength) 500 mg tablet Take 1 tablet (500 mg) by mouth every 4 hours if needed. 4-6 hours      atenolol (Tenormin) 25 mg tablet Take 2 tablets (50 mg) by mouth once daily. 1/2 tab prn 180 tablet 3    celecoxib (CeleBREX) 200 mg capsule Take 1-2 capsules (200-400 mg) by mouth if needed.      cetirizine (ZyrTEC) 10 mg tablet Take 1 tablet (10 mg) by mouth once daily.      clobetasol (Temovate) 0.05 % cream Apply 1 Application topically 2 times a day. Apply to affected area      dilTIAZem CD (Cartia XT) 120 mg 24 hr capsule Take 1 capsule (120 mg) by mouth once daily. 90 capsule 3    fenofibrate (Tricor) 48 mg tablet Take 1 tablet (48 mg) by mouth once daily. 90 tablet 3    folic acid (Folvite) 1 mg tablet Take 1 tablet (1 mg) by mouth once daily.      Jardiance 10 mg TAKE ONE TABLET (10 MG) BY MOUTH ONCE DAILY. 90 tablet 3    levothyroxine (Synthroid, Levoxyl) 88 mcg tablet Take 1 tablet (88 mcg) by mouth once daily. 90 tablet 3    mometasone (Asmanex HFA) 100 mcg/actuation HFA aerosol inhaler Inhale 2 puffs once daily.  0.0001 g 3    multivitamin with minerals iron-free (multivitamin-iron-minerals-folic acid) Take 1 tablet by mouth once daily.      omeprazole (PriLOSEC) 40 mg DR capsule TAKE ONE CAPSULE BY MOUTH OCNE DAILY IN THE MORNING BEFORE MEALS 90 capsule 3    predniSONE (Deltasone) 5 mg tablet Take 1 tablet (5 mg) by mouth once daily.      rivaroxaban (Xarelto) 20 mg tablet Take 1 tablet (20 mg) by mouth once daily in the evening. Take with meals. Take with food 90 tablet 3    rOPINIRole (Requip) 0.5 mg tablet Take 1 tablet (0.5 mg) by mouth once daily at bedtime. 90 tablet 3    rosuvastatin (Crestor) 40 mg tablet Take 1 tablet (40 mg) by mouth once daily. 90 tablet 3    sulfaSALAzine (Azulfidine) 500 mg tablet Take 1 tablet (500 mg) by mouth 3 times a day.      tiZANidine (Zanaflex) 4 mg tablet Take 1 tablet (4 mg) by mouth as needed at bedtime for muscle spasms. 30 tablet 3    traZODone (Desyrel) 50 mg tablet Take 1 tablet (50 mg) by mouth as needed at bedtime.      valACYclovir (Valtrex) 1 gram tablet Take 1 tablet (1,000 mg) by mouth 2 times a day. For 10 days then reduce to 1 tab daily thereafter       No current facility-administered medications for this visit.       Objective     Lab on 11/27/2024   Component Date Value Ref Range Status    Glucose 11/27/2024 90  74 - 99 mg/dL Final    Sodium 11/27/2024 143  136 - 145 mmol/L Final    Potassium 11/27/2024 4.0  3.5 - 5.3 mmol/L Final    Chloride 11/27/2024 106  98 - 107 mmol/L Final    Bicarbonate 11/27/2024 28  21 - 32 mmol/L Final    Anion Gap 11/27/2024 13  10 - 20 mmol/L Final    Urea Nitrogen 11/27/2024 24 (H)  6 - 23 mg/dL Final    Creatinine 11/27/2024 0.97  0.50 - 1.05 mg/dL Final    eGFR 11/27/2024 66  >60 mL/min/1.73m*2 Final    Calcium 11/27/2024 10.1  8.6 - 10.6 mg/dL Final    POCT Calcium, Ionized 11/27/2024 1.27  1.1 - 1.33 mmol/L Final    Parathyroid Hormone, Intact 11/27/2024 45.4  18.5 - 88.0 pg/mL Final   Lab on 10/22/2024   Component Date Value Ref  Range Status    WBC 10/22/2024 5.5  4.4 - 11.3 x10*3/uL Final    nRBC 10/22/2024 0.0  0.0 - 0.0 /100 WBCs Final    RBC 10/22/2024 4.30  4.00 - 5.20 x10*6/uL Final    Hemoglobin 10/22/2024 13.5  12.0 - 16.0 g/dL Final    Hematocrit 10/22/2024 42.0  36.0 - 46.0 % Final    MCV 10/22/2024 98  80 - 100 fL Final    MCH 10/22/2024 31.4  26.0 - 34.0 pg Final    MCHC 10/22/2024 32.1  32.0 - 36.0 g/dL Final    RDW 10/22/2024 13.4  11.5 - 14.5 % Final    Platelets 10/22/2024 302  150 - 450 x10*3/uL Final    Albumin, Urine Random 10/22/2024 21.9  Not established mg/L Final    Creatinine, Urine Random 10/22/2024 183.0  20.0 - 320.0 mg/dL Final    Albumin/Creatinine Ratio 10/22/2024 12.0  <30.0 ug/mg Creat Final    Glucose 10/22/2024 95  74 - 99 mg/dL Final    Sodium 10/22/2024 141  136 - 145 mmol/L Final    Potassium 10/22/2024 4.6  3.5 - 5.3 mmol/L Final    Chloride 10/22/2024 107  98 - 107 mmol/L Final    Bicarbonate 10/22/2024 27  21 - 32 mmol/L Final    Anion Gap 10/22/2024 12  10 - 20 mmol/L Final    Urea Nitrogen 10/22/2024 21  6 - 23 mg/dL Final    Creatinine 10/22/2024 1.11 (H)  0.50 - 1.05 mg/dL Final    eGFR 10/22/2024 56 (L)  >60 mL/min/1.73m*2 Final    Calcium 10/22/2024 11.0 (H)  8.6 - 10.6 mg/dL Final       Radiology: Reviewed imaging in powerchart.  No results found.    Family History   Problem Relation Name Age of Onset    Atrial fibrillation Father      Atrial fibrillation Brother      Atrial fibrillation Mother's Sister      Diabetes Mother's Sister      Alzheimer's disease Maternal Grandmother       Social History     Socioeconomic History    Marital status:    Tobacco Use    Smoking status: Former     Types: Cigarettes   Substance and Sexual Activity    Alcohol use: Not Currently    Drug use: Never     Past Medical History:   Diagnosis Date    Contusion of right front wall of thorax, initial encounter     Bruised rib, right, initial encounter    Cutaneous abscess of face 04/14/2015    Abscess, eyebrow     Essential (primary) hypertension 06/15/2021    Benign essential hypertension    Hyperlipidemia, unspecified 09/13/2022    Hyperlipidemia    Hypothyroidism, unspecified 09/13/2022    Hypothyroidism    Toxic effect of venom of bees, accidental (unintentional), initial encounter 08/29/2019    Bee sting    Unspecified atrial fibrillation (Multi) 08/21/2019    Atrial fibrillation with RVR     Past Surgical History:   Procedure Laterality Date    OTHER SURGICAL HISTORY  11/26/2018    Ablation    OTHER SURGICAL HISTORY  11/26/2018    Tubal ligation       Charting was completed using voice recognition technology and may include unintended errors.

## 2024-12-04 RX ORDER — FENOFIBRATE 48 MG/1
48 TABLET, FILM COATED ORAL DAILY
Qty: 90 TABLET | Refills: 3 | Status: SHIPPED | OUTPATIENT
Start: 2024-12-04 | End: 2025-12-04

## 2025-01-13 ENCOUNTER — TELEPHONE (OUTPATIENT)
Dept: PRIMARY CARE | Facility: CLINIC | Age: 65
End: 2025-01-13
Payer: COMMERCIAL

## 2025-01-13 DIAGNOSIS — E78.5 HYPERLIPIDEMIA, UNSPECIFIED HYPERLIPIDEMIA TYPE: ICD-10-CM

## 2025-01-13 RX ORDER — ROSUVASTATIN CALCIUM 40 MG/1
40 TABLET, COATED ORAL DAILY
Qty: 90 TABLET | Refills: 3 | Status: SHIPPED | OUTPATIENT
Start: 2025-01-13

## 2025-01-29 ENCOUNTER — TELEMEDICINE (OUTPATIENT)
Dept: PRIMARY CARE | Facility: CLINIC | Age: 65
End: 2025-01-29
Payer: COMMERCIAL

## 2025-01-29 DIAGNOSIS — J01.90 ACUTE SINUSITIS, RECURRENCE NOT SPECIFIED, UNSPECIFIED LOCATION: Primary | ICD-10-CM

## 2025-01-29 RX ORDER — CEFUROXIME AXETIL 250 MG/1
250 TABLET ORAL 2 TIMES DAILY
Qty: 20 TABLET | Refills: 0 | Status: SHIPPED | OUTPATIENT
Start: 2025-01-29 | End: 2025-02-08

## 2025-01-29 NOTE — PROGRESS NOTES
Subjective   Patient ID: Sommer Guido is a 64 y.o. female who presents for the following         Telephone Consent    An interactive audio and video telecommunication system which permits real time communications between the patient (at the originating site) and provider (at the distant site) was utilized to provide this telehealth service.   Verbal consent was requested and obtained from Sommer Guido on this date, 01/29/25 for a telehealth visit.     Chronic disease follow up (9/30/24)  PHYSICAL 3/25/2024    Assessment/Plan   Acute sinuitis:   ceftin  Hydration   Nasal saline spray.    Other medical issues see below   CKD stage 2 or 3a :   GFR 60s-50s  Albumin/cr wnl   Bmp  Renal ultrasound 11/19/24 Increased bilateral renal cortical echogenicity possibly indicating medical renal disease.   Jardiance 10mg daily     Hypercalcemia with PTH and I calcium wnl: stop taking calcium supplements     Other medical issues see below     impaired fasting glucose:  Stable, check A1c      psoriatic arthritis possibly impacting her lungs (uses an inhaler): stable, following with dr vargas , getting immunologic infusion at this time     asthma: patient follows with dr mackenzie.      a-fib : stable on xeralto, follows with dr RILEY     hypothyroid: check TSH       Follow up with GYN and follow up for mammogram     Colonoscopy dr vollweiller december 2023 with 3 year follow up     HPI  with HTN, HLD, hypothyroid, DIANNA (CPAP), CKD3, and Chronic A. Fib (xarelto and atenolol); hx COVID here for the following      Cough: coughing sharp in the throat. No fever. Body aches.   Drinking fluids. Covid test negative.   2-3 weeks ago had sinus infection z-pck   Cough up very little.   Sinus congestion with exudate. No hungary     Other medical issues see below    Walter noted GFR 45 with an elevated BUN. Recommend to repeat blood work did show improvement to this. She does have medical renal disease noted on her ultrasound but her GFR did improve to  "60s.     Hypercalcemia: patient does take calcium supplements, which she stopped. Her pth was wnl and I janeth is wnl.     Other medical issues see below.        psoriatic arthritis: following with rheumatology. patient mentions that she was placed on prednisone for her \"lungs\" as she was told that her psoriatic arthritis is impacting her lungs. she gets aria gtt but she is no longer on prednisone      A. fib: Stable, on Xarelto and atenolol, follows with Dr. RILEY     DIANNA: stable, on CPAP, follows with dr mackenzie.      CKD3: Patient has been taking off diuretics, advised to avoid NSAIDS. patient has small amounts of protein in the urine. renal ultrasound may 28, 2020 is wnl.      hypothyroid: patient denies any hypo or hypothyroid symptoms. patient denies any palpitations, diarrhea or constipation     HTN: stable,      HLD: Patient denies any muscle aches and is tolerating statin therapy    Past surgical hx:  tonsil stones/bilateral tonsillectomy Dr Skinner 9/14/2022    Visit Vitals  Smoking Status Former     PHYSICAL EXAM            REVIEW OF SYSTEMS          Allergies   Allergen Reactions    Losartan Unknown       Current Outpatient Medications   Medication Sig Dispense Refill    acetaminophen (Tylenol Extra Strength) 500 mg tablet Take 1 tablet (500 mg) by mouth every 4 hours if needed. 4-6 hours      atenolol (Tenormin) 25 mg tablet Take 2 tablets (50 mg) by mouth once daily. 1/2 tab prn 180 tablet 3    celecoxib (CeleBREX) 200 mg capsule Take 1-2 capsules (200-400 mg) by mouth if needed.      cetirizine (ZyrTEC) 10 mg tablet Take 1 tablet (10 mg) by mouth once daily.      clobetasol (Temovate) 0.05 % cream Apply 1 Application topically 2 times a day. Apply to affected area      dilTIAZem CD (Cartia XT) 120 mg 24 hr capsule Take 1 capsule (120 mg) by mouth once daily. 90 capsule 3    fenofibrate (Tricor) 48 mg tablet Take 1 tablet (48 mg) by mouth once daily. 90 tablet 3    folic acid (Folvite) 1 mg tablet Take 1 tablet " (1 mg) by mouth once daily.      Jardiance 10 mg TAKE ONE TABLET (10 MG) BY MOUTH ONCE DAILY. 90 tablet 3    levothyroxine (Synthroid, Levoxyl) 88 mcg tablet Take 1 tablet (88 mcg) by mouth once daily. 90 tablet 3    mometasone (Asmanex HFA) 100 mcg/actuation HFA aerosol inhaler Inhale 2 puffs once daily. 0.0001 g 3    multivitamin with minerals iron-free (multivitamin-iron-minerals-folic acid) Take 1 tablet by mouth once daily.      omeprazole (PriLOSEC) 40 mg DR capsule TAKE ONE CAPSULE BY MOUTH OCNE DAILY IN THE MORNING BEFORE MEALS 90 capsule 3    predniSONE (Deltasone) 5 mg tablet Take 1 tablet (5 mg) by mouth once daily.      rivaroxaban (Xarelto) 20 mg tablet Take 1 tablet (20 mg) by mouth once daily in the evening. Take with meals. Take with food 90 tablet 3    rOPINIRole (Requip) 0.5 mg tablet Take 1 tablet (0.5 mg) by mouth once daily at bedtime. 90 tablet 3    rosuvastatin (Crestor) 40 mg tablet Take 1 tablet (40 mg) by mouth once daily. 90 tablet 3    sulfaSALAzine (Azulfidine) 500 mg tablet Take 1 tablet (500 mg) by mouth 3 times a day.      tiZANidine (Zanaflex) 4 mg tablet Take 1 tablet (4 mg) by mouth as needed at bedtime for muscle spasms. 30 tablet 3    traZODone (Desyrel) 50 mg tablet Take 1 tablet (50 mg) by mouth as needed at bedtime.      valACYclovir (Valtrex) 1 gram tablet Take 1 tablet (1,000 mg) by mouth 2 times a day. For 10 days then reduce to 1 tab daily thereafter       No current facility-administered medications for this visit.       Objective     Lab on 11/27/2024   Component Date Value Ref Range Status    Glucose 11/27/2024 90  74 - 99 mg/dL Final    Sodium 11/27/2024 143  136 - 145 mmol/L Final    Potassium 11/27/2024 4.0  3.5 - 5.3 mmol/L Final    Chloride 11/27/2024 106  98 - 107 mmol/L Final    Bicarbonate 11/27/2024 28  21 - 32 mmol/L Final    Anion Gap 11/27/2024 13  10 - 20 mmol/L Final    Urea Nitrogen 11/27/2024 24 (H)  6 - 23 mg/dL Final    Creatinine 11/27/2024 0.97  0.50  - 1.05 mg/dL Final    eGFR 11/27/2024 66  >60 mL/min/1.73m*2 Final    Calcium 11/27/2024 10.1  8.6 - 10.6 mg/dL Final    POCT Calcium, Ionized 11/27/2024 1.27  1.1 - 1.33 mmol/L Final    Parathyroid Hormone, Intact 11/27/2024 45.4  18.5 - 88.0 pg/mL Final   Lab on 10/22/2024   Component Date Value Ref Range Status    WBC 10/22/2024 5.5  4.4 - 11.3 x10*3/uL Final    nRBC 10/22/2024 0.0  0.0 - 0.0 /100 WBCs Final    RBC 10/22/2024 4.30  4.00 - 5.20 x10*6/uL Final    Hemoglobin 10/22/2024 13.5  12.0 - 16.0 g/dL Final    Hematocrit 10/22/2024 42.0  36.0 - 46.0 % Final    MCV 10/22/2024 98  80 - 100 fL Final    MCH 10/22/2024 31.4  26.0 - 34.0 pg Final    MCHC 10/22/2024 32.1  32.0 - 36.0 g/dL Final    RDW 10/22/2024 13.4  11.5 - 14.5 % Final    Platelets 10/22/2024 302  150 - 450 x10*3/uL Final    Albumin, Urine Random 10/22/2024 21.9  Not established mg/L Final    Creatinine, Urine Random 10/22/2024 183.0  20.0 - 320.0 mg/dL Final    Albumin/Creatinine Ratio 10/22/2024 12.0  <30.0 ug/mg Creat Final    Glucose 10/22/2024 95  74 - 99 mg/dL Final    Sodium 10/22/2024 141  136 - 145 mmol/L Final    Potassium 10/22/2024 4.6  3.5 - 5.3 mmol/L Final    Chloride 10/22/2024 107  98 - 107 mmol/L Final    Bicarbonate 10/22/2024 27  21 - 32 mmol/L Final    Anion Gap 10/22/2024 12  10 - 20 mmol/L Final    Urea Nitrogen 10/22/2024 21  6 - 23 mg/dL Final    Creatinine 10/22/2024 1.11 (H)  0.50 - 1.05 mg/dL Final    eGFR 10/22/2024 56 (L)  >60 mL/min/1.73m*2 Final    Calcium 10/22/2024 11.0 (H)  8.6 - 10.6 mg/dL Final       Radiology: Reviewed imaging in powerchart.  No results found.    Family History   Problem Relation Name Age of Onset    Atrial fibrillation Father      Atrial fibrillation Brother      Atrial fibrillation Mother's Sister      Diabetes Mother's Sister      Alzheimer's disease Maternal Grandmother       Social History     Socioeconomic History    Marital status:    Tobacco Use    Smoking status: Former      Types: Cigarettes   Substance and Sexual Activity    Alcohol use: Not Currently    Drug use: Never     Past Medical History:   Diagnosis Date    Contusion of right front wall of thorax, initial encounter     Bruised rib, right, initial encounter    Cutaneous abscess of face 04/14/2015    Abscess, eyebrow    Essential (primary) hypertension 06/15/2021    Benign essential hypertension    Hyperlipidemia, unspecified 09/13/2022    Hyperlipidemia    Hypothyroidism, unspecified 09/13/2022    Hypothyroidism    Toxic effect of venom of bees, accidental (unintentional), initial encounter 08/29/2019    Bee sting    Unspecified atrial fibrillation (Multi) 08/21/2019    Atrial fibrillation with RVR     Past Surgical History:   Procedure Laterality Date    OTHER SURGICAL HISTORY  11/26/2018    Ablation    OTHER SURGICAL HISTORY  11/26/2018    Tubal ligation       Charting was completed using voice recognition technology and may include unintended errors.

## 2025-01-30 ENCOUNTER — TELEPHONE (OUTPATIENT)
Dept: PRIMARY CARE | Facility: CLINIC | Age: 65
End: 2025-01-30
Payer: COMMERCIAL

## 2025-01-30 NOTE — TELEPHONE ENCOUNTER
PT had a virtual yesterday and Dr prescribed meds and PT noticed this morning when coughing that she is spitting up blood.  She said she feels like she has glass in her throat.

## 2025-01-31 ENCOUNTER — PATIENT OUTREACH (OUTPATIENT)
Dept: CARE COORDINATION | Facility: CLINIC | Age: 65
End: 2025-01-31
Payer: COMMERCIAL

## 2025-01-31 NOTE — PROGRESS NOTES
Attempt made to reach patient for transitions of care outreach and no contact made with patient. Left voicemail with my name and contact information.  Patient was discharged from [facility].   Encouraged follow up to be scheduled with Jhonathan Figueroa DO       Thank you,   Tita Gay , RN   Nurse Care Manager   [facility]   [phone]

## 2025-03-04 ENCOUNTER — PATIENT OUTREACH (OUTPATIENT)
Dept: CARE COORDINATION | Facility: CLINIC | Age: 65
End: 2025-03-04
Payer: COMMERCIAL

## 2025-03-04 NOTE — PROGRESS NOTES
Outreach call to patient to check in 30 days after hospital discharge to support smooth transition of care.  Left message for Sommer.       Tita Gay , RN   Nurse Care Manager   [facility]   [phone]

## 2025-04-04 DIAGNOSIS — E03.9 HYPOTHYROIDISM, UNSPECIFIED TYPE: ICD-10-CM

## 2025-04-04 DIAGNOSIS — E78.5 HYPERLIPIDEMIA, UNSPECIFIED HYPERLIPIDEMIA TYPE: ICD-10-CM

## 2025-04-04 DIAGNOSIS — I10 BENIGN ESSENTIAL HYPERTENSION: ICD-10-CM

## 2025-04-04 RX ORDER — ATENOLOL 25 MG/1
50 TABLET ORAL DAILY
Qty: 180 TABLET | Refills: 3 | Status: SHIPPED | OUTPATIENT
Start: 2025-04-04 | End: 2026-04-04

## 2025-04-04 RX ORDER — LEVOTHYROXINE SODIUM 88 UG/1
88 TABLET ORAL DAILY
Qty: 90 TABLET | Refills: 3 | Status: SHIPPED | OUTPATIENT
Start: 2025-04-04

## 2025-04-04 RX ORDER — FENOFIBRATE 48 MG/1
48 TABLET, FILM COATED ORAL DAILY
Qty: 90 TABLET | Refills: 3 | Status: SHIPPED | OUTPATIENT
Start: 2025-04-04 | End: 2026-04-04

## 2025-04-28 ENCOUNTER — APPOINTMENT (OUTPATIENT)
Dept: PRIMARY CARE | Facility: CLINIC | Age: 65
End: 2025-04-28
Payer: COMMERCIAL

## 2025-07-07 ENCOUNTER — APPOINTMENT (OUTPATIENT)
Dept: PRIMARY CARE | Facility: CLINIC | Age: 65
End: 2025-07-07
Payer: COMMERCIAL

## 2025-07-07 VITALS
HEIGHT: 68 IN | SYSTOLIC BLOOD PRESSURE: 133 MMHG | BODY MASS INDEX: 23.95 KG/M2 | WEIGHT: 158 LBS | OXYGEN SATURATION: 98 % | DIASTOLIC BLOOD PRESSURE: 74 MMHG | HEART RATE: 63 BPM

## 2025-07-07 DIAGNOSIS — L40.50 PSORIATIC ARTHRITIS (MULTI): ICD-10-CM

## 2025-07-07 DIAGNOSIS — N18.2 STAGE 2 CHRONIC KIDNEY DISEASE: ICD-10-CM

## 2025-07-07 DIAGNOSIS — I48.20 CHRONIC ATRIAL FIBRILLATION (MULTI): ICD-10-CM

## 2025-07-07 DIAGNOSIS — E78.5 HYPERLIPIDEMIA, UNSPECIFIED HYPERLIPIDEMIA TYPE: ICD-10-CM

## 2025-07-07 DIAGNOSIS — I10 BENIGN ESSENTIAL HYPERTENSION: ICD-10-CM

## 2025-07-07 DIAGNOSIS — B00.9 HERPES SIMPLEX: ICD-10-CM

## 2025-07-07 DIAGNOSIS — Z00.00 ANNUAL PHYSICAL EXAM: Primary | ICD-10-CM

## 2025-07-07 PROCEDURE — 99396 PREV VISIT EST AGE 40-64: CPT | Performed by: INTERNAL MEDICINE

## 2025-07-07 PROCEDURE — 3075F SYST BP GE 130 - 139MM HG: CPT | Performed by: INTERNAL MEDICINE

## 2025-07-07 PROCEDURE — 3008F BODY MASS INDEX DOCD: CPT | Performed by: INTERNAL MEDICINE

## 2025-07-07 PROCEDURE — 3078F DIAST BP <80 MM HG: CPT | Performed by: INTERNAL MEDICINE

## 2025-07-07 RX ORDER — VALACYCLOVIR HYDROCHLORIDE 1 G/1
1000 TABLET, FILM COATED ORAL 2 TIMES DAILY
Qty: 30 TABLET | Refills: 2 | Status: SHIPPED | OUTPATIENT
Start: 2025-07-07

## 2025-07-07 NOTE — PROGRESS NOTES
"Subjective   Patient ID: Sommer Guido is a 65 y.o. female who presents for the following    PHYSICAL 7/7/2025    Chronic disease follow up (9/30/24)      Assessment/Plan   In ability to smell recommend ENT follow up     CKD stage 2 or 3a :   GFR 60s-50s  Albumin/cr wnl   Renal ultrasound 11/19/24 Increased bilateral renal cortical echogenicity possibly indicating medical renal disease.   Jardiance 10mg daily     Hypercalcemia with PTH and I calcium wnl: stop taking calcium supplements        impaired fasting glucose:  Stable, much improved over the year with weight loss 217lbs --> 158lbs   check A1c      psoriatic arthritis possibly impacting her lungs (uses an inhaler): stable, following with dr vargas , getting immunologic infusion at this time     asthma: patient follows with dr mackenzie.      a-fib : stable on xeralto, follows with dr RILEY     hypothyroid: check TSH       Follow up with GYN and follow up for mammogram     Colonoscopy dr vollweiller december 2023 with 3 year follow up     HPI  with HTN, HLD, hypothyroid, DIANNA (CPAP), CKD3, and Chronic A. Fib (xarelto and atenolol); hx COVID here for the following      Inability to smell smoke or taste foods well. Been this way since the late winter when she caught a cold.     Walter noted GFR 45 with an elevated BUN. Recommend to repeat blood work did show improvement to this. She does have medical renal disease noted on her ultrasound but her GFR did improve to 60s and now to 70s. Patient is following with Dr Huitron.     Hypercalcemia: patient does take calcium supplements, which she stopped. Her pth was wnl and I janeth is wnl.      psoriatic arthritis: following with rheumatology. patient mentions that she was placed on prednisone for her \"lungs\" as she was told that her psoriatic arthritis is impacting her lungs. she gets aria gtt but she is no longer on prednisone      A. fib: Stable, on Xarelto and atenolol, follows with Dr. RILEY     DIANNA: stable, on CPAP, follows with dr" "epan.      CKD3: Patient has been taking off diuretics, advised to avoid NSAIDS. patient has small amounts of protein in the urine. renal ultrasound may 28, 2020 is wnl.      hypothyroid: patient denies any hypo or hypothyroid symptoms. patient denies any palpitations, diarrhea or constipation     HTN: stable,      HLD: Patient denies any muscle aches and is tolerating statin therapy    Past surgical hx:  tonsil stones/bilateral tonsillectomy Dr Skinner 9/14/2022    Visit Vitals  /74 (BP Location: Left arm, Patient Position: Sitting, BP Cuff Size: Adult)   Pulse 63   Ht 1.727 m (5' 8\")   Wt 71.7 kg (158 lb)   SpO2 98%   BMI 24.02 kg/m²   Smoking Status Former   BSA 1.85 m²     PHYSICAL EXAM        General appearance: Alert and in no acute distress. speech is clear and coherent  HEENT: Sclera and conjunctiva white, EOMI, uvela midline, no mouth lesions. PERRLA,  nasal turbinates are not swollen without exudate. TM's Tucker with cone of light, external ear canal with scant cerumen. No head trauma  Neck: no carotid bruits or thyromegaly. no lymphadenopathy   Respiratory : No respiratory distress, normal respiratory rhythm and effort. Clear bilateral breath sounds. No wheezing or rhonchi.   Cardiovascular: heart rate regular, S1, S2. no murmurs. no Lower extremity edema  Skin inspection: Normal skin color and pigmentation, normal skin turgor and no visible rash, induration, or cellulitis  MSK: 5/5 strength upper and lower extremities without gait abnormalities. no loss of muscle mass   Neuro: 2-12 CN grossly intact.  no slurred speech. no lateralizing deficit  Psychiatric Orientation: Oriented to person, place, and time. no depression, homicidal or suicidal thoughts, normal affect  Abdomen: soft, none tender, none distended. no organomegaly      REVIEW OF SYSTEMS      Constitutional: not feeling tired and no fever, chills or sweats. Denies weight loss    HEENT: no earache and no sore throat. no blurred vision and " or double vision. no headache  Cardiovascular: no exertional chest pain, no palpitations, no lower extremity edema    Lungs: Denies shortness of breath, exertional dyspnea, wheezing  Gastrointestinal: no change in bowel habits, no diarrhea, no nausea, no vomiting and no abdominal pain. Denies Melena, brbpr or dark stool  Musculoskeletal: no myalgias, no muscle weakness and no limb swelling.   Skin: no rashes, no change in skin color and pigmentation and no skin lumps.   Neurological: no headaches, no seizures, no numbness, no lateralizing deficits and no fainting.   Psychiatric: no depression and no anxiety.   Urine: denies polyuria, hematuria, dysuria   Endocrine: no cold intolerance, no heat intolerance      Allergies   Allergen Reactions    Losartan Unknown       Current Outpatient Medications   Medication Sig Dispense Refill    acetaminophen (Tylenol Extra Strength) 500 mg tablet Take 1 tablet (500 mg) by mouth every 4 hours if needed. 4-6 hours      atenolol (Tenormin) 25 mg tablet Take 2 tablets (50 mg) by mouth once daily. 1/2 tab prn 180 tablet 3    cetirizine (ZyrTEC) 10 mg tablet Take 1 tablet (10 mg) by mouth once daily.      fenofibrate (Tricor) 48 mg tablet Take 1 tablet (48 mg) by mouth once daily. 90 tablet 3    folic acid (Folvite) 1 mg tablet Take 1 tablet (1 mg) by mouth once daily.      Jardiance 10 mg TAKE ONE TABLET (10 MG) BY MOUTH ONCE DAILY. 90 tablet 3    levothyroxine (Synthroid, Levoxyl) 88 mcg tablet Take 1 tablet (88 mcg) by mouth once daily. 90 tablet 3    mometasone (Asmanex HFA) 100 mcg/actuation HFA aerosol inhaler Inhale 2 puffs once daily. 0.0001 g 3    omeprazole (PriLOSEC) 40 mg DR capsule TAKE ONE CAPSULE BY MOUTH OCNE DAILY IN THE MORNING BEFORE MEALS 90 capsule 3    rOPINIRole (Requip) 0.5 mg tablet Take 1 tablet (0.5 mg) by mouth once daily at bedtime. 90 tablet 3    rosuvastatin (Crestor) 40 mg tablet Take 1 tablet (40 mg) by mouth once daily. 90 tablet 3    sulfaSALAzine  (Azulfidine) 500 mg tablet Take 1 tablet (500 mg) by mouth 3 times a day.      tiZANidine (Zanaflex) 4 mg tablet Take 1 tablet (4 mg) by mouth as needed at bedtime for muscle spasms. 30 tablet 3    traZODone (Desyrel) 50 mg tablet Take 1 tablet (50 mg) by mouth as needed at bedtime.      valACYclovir (Valtrex) 1 gram tablet Take 1 tablet (1,000 mg) by mouth 2 times a day. For 10 days then reduce to 1 tab daily thereafter      celecoxib (CeleBREX) 200 mg capsule Take 1-2 capsules (200-400 mg) by mouth if needed. (Patient not taking: Reported on 7/7/2025)      clobetasol (Temovate) 0.05 % cream Apply 1 Application topically 2 times a day. Apply to affected area      dilTIAZem CD (Cartia XT) 120 mg 24 hr capsule Take 1 capsule (120 mg) by mouth once daily. 90 capsule 3    multivitamin with minerals iron-free (multivitamin-iron-minerals-folic acid) Take 1 tablet by mouth once daily. (Patient not taking: Reported on 7/7/2025)      predniSONE (Deltasone) 5 mg tablet Take 1 tablet (5 mg) by mouth once daily. (Patient not taking: Reported on 7/7/2025)      rivaroxaban (Xarelto) 20 mg tablet Take 1 tablet (20 mg) by mouth once daily in the evening. Take with meals. Take with food 90 tablet 3     No current facility-administered medications for this visit.       Objective     No visits with results within 4 Month(s) from this visit.   Latest known visit with results is:   Lab on 11/27/2024   Component Date Value Ref Range Status    Glucose 11/27/2024 90  74 - 99 mg/dL Final    Sodium 11/27/2024 143  136 - 145 mmol/L Final    Potassium 11/27/2024 4.0  3.5 - 5.3 mmol/L Final    Chloride 11/27/2024 106  98 - 107 mmol/L Final    Bicarbonate 11/27/2024 28  21 - 32 mmol/L Final    Anion Gap 11/27/2024 13  10 - 20 mmol/L Final    Urea Nitrogen 11/27/2024 24 (H)  6 - 23 mg/dL Final    Creatinine 11/27/2024 0.97  0.50 - 1.05 mg/dL Final    eGFR 11/27/2024 66  >60 mL/min/1.73m*2 Final    Calcium 11/27/2024 10.1  8.6 - 10.6 mg/dL Final     POCT Calcium, Ionized 11/27/2024 1.27  1.1 - 1.33 mmol/L Final    Parathyroid Hormone, Intact 11/27/2024 45.4  18.5 - 88.0 pg/mL Final       Radiology: Reviewed imaging in powerchart.  No results found.    Family History   Problem Relation Name Age of Onset    Atrial fibrillation Father      Atrial fibrillation Brother      Atrial fibrillation Mother's Sister      Diabetes Mother's Sister      Alzheimer's disease Maternal Grandmother       Social History     Socioeconomic History    Marital status:    Tobacco Use    Smoking status: Former     Types: Cigarettes   Substance and Sexual Activity    Alcohol use: Not Currently    Drug use: Never     Past Medical History:   Diagnosis Date    Contusion of right front wall of thorax, initial encounter     Bruised rib, right, initial encounter    Cutaneous abscess of face 04/14/2015    Abscess, eyebrow    Essential (primary) hypertension 06/15/2021    Benign essential hypertension    Hyperlipidemia, unspecified 09/13/2022    Hyperlipidemia    Hypothyroidism, unspecified 09/13/2022    Hypothyroidism    Toxic effect of venom of bees, accidental (unintentional), initial encounter 08/29/2019    Bee sting    Unspecified atrial fibrillation (Multi) 08/21/2019    Atrial fibrillation with RVR     Past Surgical History:   Procedure Laterality Date    OTHER SURGICAL HISTORY  11/26/2018    Ablation    OTHER SURGICAL HISTORY  11/26/2018    Tubal ligation       Charting was completed using voice recognition technology and may include unintended errors.

## 2025-07-08 LAB
25(OH)D3+25(OH)D2 SERPL-MCNC: 35 NG/ML (ref 30–100)
ALBUMIN SERPL-MCNC: 4.7 G/DL (ref 3.6–5.1)
ALBUMIN/CREAT UR: 18 MG/G CREAT
ALP SERPL-CCNC: 68 U/L (ref 37–153)
ALT SERPL-CCNC: 20 U/L (ref 6–29)
ANION GAP SERPL CALCULATED.4IONS-SCNC: 7 MMOL/L (CALC) (ref 7–17)
AST SERPL-CCNC: 26 U/L (ref 10–35)
BILIRUB SERPL-MCNC: 0.7 MG/DL (ref 0.2–1.2)
BUN SERPL-MCNC: 22 MG/DL (ref 7–25)
CALCIUM SERPL-MCNC: 10.2 MG/DL (ref 8.6–10.4)
CHLORIDE SERPL-SCNC: 107 MMOL/L (ref 98–110)
CHOLEST SERPL-MCNC: 175 MG/DL
CHOLEST/HDLC SERPL: 2.2 (CALC)
CO2 SERPL-SCNC: 27 MMOL/L (ref 20–32)
CREAT SERPL-MCNC: 0.99 MG/DL (ref 0.5–1.05)
CREAT UR-MCNC: 51 MG/DL (ref 20–275)
EGFRCR SERPLBLD CKD-EPI 2021: 64 ML/MIN/1.73M2
ERYTHROCYTE [DISTWIDTH] IN BLOOD BY AUTOMATED COUNT: 12.8 % (ref 11–15)
EST. AVERAGE GLUCOSE BLD GHB EST-MCNC: 111 MG/DL
EST. AVERAGE GLUCOSE BLD GHB EST-SCNC: 6.2 MMOL/L
GLUCOSE SERPL-MCNC: 88 MG/DL (ref 65–99)
HBA1C MFR BLD: 5.5 %
HCT VFR BLD AUTO: 39 % (ref 35–45)
HDLC SERPL-MCNC: 78 MG/DL
HGB BLD-MCNC: 12.7 G/DL (ref 11.7–15.5)
LDLC SERPL CALC-MCNC: 77 MG/DL (CALC)
MCH RBC QN AUTO: 31 PG (ref 27–33)
MCHC RBC AUTO-ENTMCNC: 32.6 G/DL (ref 32–36)
MCV RBC AUTO: 95.1 FL (ref 80–100)
MICROALBUMIN UR-MCNC: 0.9 MG/DL
NONHDLC SERPL-MCNC: 97 MG/DL (CALC)
PLATELET # BLD AUTO: 233 THOUSAND/UL (ref 140–400)
PMV BLD REES-ECKER: 10 FL (ref 7.5–12.5)
POTASSIUM SERPL-SCNC: 4.5 MMOL/L (ref 3.5–5.3)
PROT SERPL-MCNC: 6.8 G/DL (ref 6.1–8.1)
RBC # BLD AUTO: 4.1 MILLION/UL (ref 3.8–5.1)
SODIUM SERPL-SCNC: 141 MMOL/L (ref 135–146)
TRIGL SERPL-MCNC: 112 MG/DL
TSH SERPL-ACNC: 2.58 MIU/L (ref 0.4–4.5)
WBC # BLD AUTO: 6 THOUSAND/UL (ref 3.8–10.8)

## 2025-08-20 LAB
NON-UH HIE ALB: 4.3 G/DL (ref 3.4–5)
NON-UH HIE BUN/CREAT RATIO: 20
NON-UH HIE BUN: 22 MG/DL (ref 9–23)
NON-UH HIE CALCIUM: 10.1 MG/DL (ref 8.7–10.4)
NON-UH HIE CALCULATED OSMOLALITY: 286 MOSM/KG (ref 275–295)
NON-UH HIE CHLORIDE: 106 MMOL/L (ref 98–107)
NON-UH HIE CO2, VENOUS: 28 MMOL/L (ref 20–31)
NON-UH HIE CORRECTED CALCIUM: ABNORMAL MG/DL (ref 8.5–10.5)
NON-UH HIE CREATININE, URINE MG/DL: 91.2 MG/DL
NON-UH HIE CREATININE: 1.1 MG/DL (ref 0.5–0.8)
NON-UH HIE GFR AA: >60
NON-UH HIE GFR ESTIMATED: 50
NON-UH HIE GLOMERULAR FILTRATION RATE: 50 ML/MIN/1.73M?
NON-UH HIE GLUCOSE: 85 MG/DL (ref 74–106)
NON-UH HIE HCT: 38.6 % (ref 36–46)
NON-UH HIE HGB: 12.9 G/DL (ref 12–16)
NON-UH HIE INSTR WBC ND: 5.8
NON-UH HIE K: 4.1 MMOL/L (ref 3.5–5.1)
NON-UH HIE MCH: 31.5 PG (ref 27–34)
NON-UH HIE MCHC: 33.3 G/DL (ref 32–37)
NON-UH HIE MCV: 94.4 FL (ref 80–100)
NON-UH HIE MICROALBUMIN, URINE MG/L: 34 MG/L
NON-UH HIE MICROALBUMIN/CREATININE RATIO: 37 MG MALB/GM CREAT (ref 0–30)
NON-UH HIE MPV: 8 FL (ref 7.4–10.4)
NON-UH HIE NA: 142 MMOL/L (ref 135–145)
NON-UH HIE PHOSPHORUS: 3.3 MG/DL (ref 2–5.1)
NON-UH HIE PLATELET: 244 X10 (ref 150–450)
NON-UH HIE RBC: 4.09 X10 (ref 4.2–5.4)
NON-UH HIE RDW: 13.9 % (ref 11.5–14.5)
NON-UH HIE WBC: 5.8 X10 (ref 4.5–11)

## 2025-08-21 LAB
NON-UH HIE ANA PATTERN: NORMAL
NON-UH HIE ANTI-NUCLEAR AB QUANT: NORMAL
NON-UH HIE ANTI-NUCLEAR ANTIBODY: ABNORMAL

## 2025-09-05 ENCOUNTER — TELEPHONE (OUTPATIENT)
Dept: PRIMARY CARE | Facility: CLINIC | Age: 65
End: 2025-09-05
Payer: COMMERCIAL

## 2025-09-05 DIAGNOSIS — R73.01 IMPAIRED FASTING GLUCOSE: ICD-10-CM

## 2025-09-05 DIAGNOSIS — N18.30 STAGE 3 CHRONIC KIDNEY DISEASE, UNSPECIFIED WHETHER STAGE 3A OR 3B CKD (MULTI): ICD-10-CM

## 2026-01-05 ENCOUNTER — APPOINTMENT (OUTPATIENT)
Dept: PRIMARY CARE | Facility: CLINIC | Age: 66
End: 2026-01-05
Payer: COMMERCIAL